# Patient Record
Sex: MALE | Race: WHITE | NOT HISPANIC OR LATINO | URBAN - METROPOLITAN AREA
[De-identification: names, ages, dates, MRNs, and addresses within clinical notes are randomized per-mention and may not be internally consistent; named-entity substitution may affect disease eponyms.]

---

## 2023-12-31 ENCOUNTER — INPATIENT (INPATIENT)
Facility: HOSPITAL | Age: 73
LOS: 2 days | Discharge: HOME | DRG: 193 | End: 2024-01-03
Attending: INTERNAL MEDICINE | Admitting: STUDENT IN AN ORGANIZED HEALTH CARE EDUCATION/TRAINING PROGRAM
Payer: COMMERCIAL

## 2023-12-31 VITALS
DIASTOLIC BLOOD PRESSURE: 71 MMHG | TEMPERATURE: 97 F | SYSTOLIC BLOOD PRESSURE: 117 MMHG | RESPIRATION RATE: 20 BRPM | HEART RATE: 91 BPM | OXYGEN SATURATION: 97 % | WEIGHT: 164.91 LBS | HEIGHT: 72 IN

## 2023-12-31 DIAGNOSIS — I48.20 CHRONIC ATRIAL FIBRILLATION, UNSPECIFIED: ICD-10-CM

## 2023-12-31 DIAGNOSIS — Z29.9 ENCOUNTER FOR PROPHYLACTIC MEASURES, UNSPECIFIED: ICD-10-CM

## 2023-12-31 DIAGNOSIS — Z86.19 PERSONAL HISTORY OF OTHER INFECTIOUS AND PARASITIC DISEASES: ICD-10-CM

## 2023-12-31 DIAGNOSIS — R06.02 SHORTNESS OF BREATH: ICD-10-CM

## 2023-12-31 DIAGNOSIS — E87.1 HYPO-OSMOLALITY AND HYPONATREMIA: ICD-10-CM

## 2023-12-31 DIAGNOSIS — J18.9 PNEUMONIA, UNSPECIFIED ORGANISM: ICD-10-CM

## 2023-12-31 DIAGNOSIS — J96.01 ACUTE RESPIRATORY FAILURE WITH HYPOXIA: ICD-10-CM

## 2023-12-31 LAB
ALBUMIN SERPL ELPH-MCNC: 2.4 G/DL — LOW (ref 3.3–5)
ALBUMIN SERPL ELPH-MCNC: 2.4 G/DL — LOW (ref 3.3–5)
ALBUMIN SERPL ELPH-MCNC: 3.1 G/DL — LOW (ref 3.3–5)
ALBUMIN SERPL ELPH-MCNC: 3.1 G/DL — LOW (ref 3.3–5)
ALP SERPL-CCNC: 65 U/L — SIGNIFICANT CHANGE UP (ref 40–120)
ALP SERPL-CCNC: 65 U/L — SIGNIFICANT CHANGE UP (ref 40–120)
ALP SERPL-CCNC: 72 U/L — SIGNIFICANT CHANGE UP (ref 40–120)
ALP SERPL-CCNC: 72 U/L — SIGNIFICANT CHANGE UP (ref 40–120)
ALT FLD-CCNC: 19 U/L — SIGNIFICANT CHANGE UP (ref 10–45)
ALT FLD-CCNC: 19 U/L — SIGNIFICANT CHANGE UP (ref 10–45)
ALT FLD-CCNC: SIGNIFICANT CHANGE UP U/L (ref 10–45)
ALT FLD-CCNC: SIGNIFICANT CHANGE UP U/L (ref 10–45)
ANION GAP SERPL CALC-SCNC: 11 MMOL/L — SIGNIFICANT CHANGE UP (ref 5–17)
ANION GAP SERPL CALC-SCNC: 11 MMOL/L — SIGNIFICANT CHANGE UP (ref 5–17)
ANION GAP SERPL CALC-SCNC: 12 MMOL/L — SIGNIFICANT CHANGE UP (ref 5–17)
ANION GAP SERPL CALC-SCNC: 12 MMOL/L — SIGNIFICANT CHANGE UP (ref 5–17)
AST SERPL-CCNC: 30 U/L — SIGNIFICANT CHANGE UP (ref 10–40)
AST SERPL-CCNC: 30 U/L — SIGNIFICANT CHANGE UP (ref 10–40)
AST SERPL-CCNC: SIGNIFICANT CHANGE UP U/L (ref 10–40)
AST SERPL-CCNC: SIGNIFICANT CHANGE UP U/L (ref 10–40)
BASE EXCESS BLDA CALC-SCNC: 2.8 MMOL/L — SIGNIFICANT CHANGE UP (ref -2–3)
BASE EXCESS BLDA CALC-SCNC: 2.8 MMOL/L — SIGNIFICANT CHANGE UP (ref -2–3)
BILIRUB SERPL-MCNC: 0.4 MG/DL — SIGNIFICANT CHANGE UP (ref 0.2–1.2)
BILIRUB SERPL-MCNC: 0.4 MG/DL — SIGNIFICANT CHANGE UP (ref 0.2–1.2)
BILIRUB SERPL-MCNC: 0.5 MG/DL — SIGNIFICANT CHANGE UP (ref 0.2–1.2)
BILIRUB SERPL-MCNC: 0.5 MG/DL — SIGNIFICANT CHANGE UP (ref 0.2–1.2)
BUN SERPL-MCNC: 14 MG/DL — SIGNIFICANT CHANGE UP (ref 7–23)
BUN SERPL-MCNC: 14 MG/DL — SIGNIFICANT CHANGE UP (ref 7–23)
BUN SERPL-MCNC: 17 MG/DL — SIGNIFICANT CHANGE UP (ref 7–23)
BUN SERPL-MCNC: 17 MG/DL — SIGNIFICANT CHANGE UP (ref 7–23)
CALCIUM SERPL-MCNC: 7.8 MG/DL — LOW (ref 8.4–10.5)
CALCIUM SERPL-MCNC: 7.8 MG/DL — LOW (ref 8.4–10.5)
CALCIUM SERPL-MCNC: 8.6 MG/DL — SIGNIFICANT CHANGE UP (ref 8.4–10.5)
CALCIUM SERPL-MCNC: 8.6 MG/DL — SIGNIFICANT CHANGE UP (ref 8.4–10.5)
CHLORIDE SERPL-SCNC: 94 MMOL/L — LOW (ref 96–108)
CHLORIDE SERPL-SCNC: 94 MMOL/L — LOW (ref 96–108)
CHLORIDE SERPL-SCNC: 95 MMOL/L — LOW (ref 96–108)
CHLORIDE SERPL-SCNC: 95 MMOL/L — LOW (ref 96–108)
CO2 BLDA-SCNC: 28 MMOL/L — HIGH (ref 19–24)
CO2 BLDA-SCNC: 28 MMOL/L — HIGH (ref 19–24)
CO2 SERPL-SCNC: 24 MMOL/L — SIGNIFICANT CHANGE UP (ref 22–31)
CO2 SERPL-SCNC: 24 MMOL/L — SIGNIFICANT CHANGE UP (ref 22–31)
CO2 SERPL-SCNC: 25 MMOL/L — SIGNIFICANT CHANGE UP (ref 22–31)
CO2 SERPL-SCNC: 25 MMOL/L — SIGNIFICANT CHANGE UP (ref 22–31)
CREAT ?TM UR-MCNC: 187 MG/DL — SIGNIFICANT CHANGE UP
CREAT ?TM UR-MCNC: 187 MG/DL — SIGNIFICANT CHANGE UP
CREAT SERPL-MCNC: 0.7 MG/DL — SIGNIFICANT CHANGE UP (ref 0.5–1.3)
EGFR: 97 ML/MIN/1.73M2 — SIGNIFICANT CHANGE UP
GLUCOSE SERPL-MCNC: 137 MG/DL — HIGH (ref 70–99)
GLUCOSE SERPL-MCNC: 137 MG/DL — HIGH (ref 70–99)
GLUCOSE SERPL-MCNC: 232 MG/DL — HIGH (ref 70–99)
GLUCOSE SERPL-MCNC: 232 MG/DL — HIGH (ref 70–99)
GRAM STN FLD: ABNORMAL
GRAM STN FLD: ABNORMAL
HCO3 BLDA-SCNC: 27 MMOL/L — SIGNIFICANT CHANGE UP (ref 21–28)
HCO3 BLDA-SCNC: 27 MMOL/L — SIGNIFICANT CHANGE UP (ref 21–28)
HCT VFR BLD CALC: 38.9 % — LOW (ref 39–50)
HCT VFR BLD CALC: 38.9 % — LOW (ref 39–50)
HGB BLD-MCNC: 13.3 G/DL — SIGNIFICANT CHANGE UP (ref 13–17)
HGB BLD-MCNC: 13.3 G/DL — SIGNIFICANT CHANGE UP (ref 13–17)
LEGIONELLA AG UR QL: NEGATIVE — SIGNIFICANT CHANGE UP
LEGIONELLA AG UR QL: NEGATIVE — SIGNIFICANT CHANGE UP
MCHC RBC-ENTMCNC: 32.1 PG — SIGNIFICANT CHANGE UP (ref 27–34)
MCHC RBC-ENTMCNC: 32.1 PG — SIGNIFICANT CHANGE UP (ref 27–34)
MCHC RBC-ENTMCNC: 34.2 GM/DL — SIGNIFICANT CHANGE UP (ref 32–36)
MCHC RBC-ENTMCNC: 34.2 GM/DL — SIGNIFICANT CHANGE UP (ref 32–36)
MCV RBC AUTO: 94 FL — SIGNIFICANT CHANGE UP (ref 80–100)
MCV RBC AUTO: 94 FL — SIGNIFICANT CHANGE UP (ref 80–100)
NRBC # BLD: 0 /100 WBCS — SIGNIFICANT CHANGE UP (ref 0–0)
NRBC # BLD: 0 /100 WBCS — SIGNIFICANT CHANGE UP (ref 0–0)
OSMOLALITY UR: 657 MOSM/KG — SIGNIFICANT CHANGE UP (ref 300–900)
OSMOLALITY UR: 657 MOSM/KG — SIGNIFICANT CHANGE UP (ref 300–900)
PCO2 BLDA: 40 MMHG — SIGNIFICANT CHANGE UP (ref 35–48)
PCO2 BLDA: 40 MMHG — SIGNIFICANT CHANGE UP (ref 35–48)
PH BLDA: 7.44 — SIGNIFICANT CHANGE UP (ref 7.35–7.45)
PH BLDA: 7.44 — SIGNIFICANT CHANGE UP (ref 7.35–7.45)
PLATELET # BLD AUTO: 380 K/UL — SIGNIFICANT CHANGE UP (ref 150–400)
PLATELET # BLD AUTO: 380 K/UL — SIGNIFICANT CHANGE UP (ref 150–400)
PO2 BLDA: 98 MMHG — SIGNIFICANT CHANGE UP (ref 83–108)
PO2 BLDA: 98 MMHG — SIGNIFICANT CHANGE UP (ref 83–108)
POTASSIUM SERPL-MCNC: 4.1 MMOL/L — SIGNIFICANT CHANGE UP (ref 3.5–5.3)
POTASSIUM SERPL-MCNC: 4.1 MMOL/L — SIGNIFICANT CHANGE UP (ref 3.5–5.3)
POTASSIUM SERPL-MCNC: SIGNIFICANT CHANGE UP MMOL/L (ref 3.5–5.3)
POTASSIUM SERPL-MCNC: SIGNIFICANT CHANGE UP MMOL/L (ref 3.5–5.3)
POTASSIUM SERPL-SCNC: 4.1 MMOL/L — SIGNIFICANT CHANGE UP (ref 3.5–5.3)
POTASSIUM SERPL-SCNC: 4.1 MMOL/L — SIGNIFICANT CHANGE UP (ref 3.5–5.3)
POTASSIUM SERPL-SCNC: SIGNIFICANT CHANGE UP MMOL/L (ref 3.5–5.3)
POTASSIUM SERPL-SCNC: SIGNIFICANT CHANGE UP MMOL/L (ref 3.5–5.3)
POTASSIUM UR-SCNC: 32 MMOL/L — SIGNIFICANT CHANGE UP
POTASSIUM UR-SCNC: 32 MMOL/L — SIGNIFICANT CHANGE UP
PROT ?TM UR-MCNC: 48 MG/DL — HIGH (ref 0–12)
PROT ?TM UR-MCNC: 48 MG/DL — HIGH (ref 0–12)
PROT SERPL-MCNC: 6.2 G/DL — SIGNIFICANT CHANGE UP (ref 6–8.3)
PROT SERPL-MCNC: 6.2 G/DL — SIGNIFICANT CHANGE UP (ref 6–8.3)
PROT SERPL-MCNC: 7.1 G/DL — SIGNIFICANT CHANGE UP (ref 6–8.3)
PROT SERPL-MCNC: 7.1 G/DL — SIGNIFICANT CHANGE UP (ref 6–8.3)
PROT/CREAT UR-RTO: 0.3 RATIO — HIGH (ref 0–0.2)
PROT/CREAT UR-RTO: 0.3 RATIO — HIGH (ref 0–0.2)
RAPID RVP RESULT: SIGNIFICANT CHANGE UP
RAPID RVP RESULT: SIGNIFICANT CHANGE UP
RBC # BLD: 4.14 M/UL — LOW (ref 4.2–5.8)
RBC # BLD: 4.14 M/UL — LOW (ref 4.2–5.8)
RBC # FLD: 13.2 % — SIGNIFICANT CHANGE UP (ref 10.3–14.5)
RBC # FLD: 13.2 % — SIGNIFICANT CHANGE UP (ref 10.3–14.5)
S PNEUM AG UR QL: NEGATIVE — SIGNIFICANT CHANGE UP
S PNEUM AG UR QL: NEGATIVE — SIGNIFICANT CHANGE UP
SAO2 % BLDA: 99.2 % — HIGH (ref 94–98)
SAO2 % BLDA: 99.2 % — HIGH (ref 94–98)
SARS-COV-2 RNA SPEC QL NAA+PROBE: SIGNIFICANT CHANGE UP
SARS-COV-2 RNA SPEC QL NAA+PROBE: SIGNIFICANT CHANGE UP
SODIUM SERPL-SCNC: 130 MMOL/L — LOW (ref 135–145)
SODIUM SERPL-SCNC: 130 MMOL/L — LOW (ref 135–145)
SODIUM SERPL-SCNC: 131 MMOL/L — LOW (ref 135–145)
SODIUM SERPL-SCNC: 131 MMOL/L — LOW (ref 135–145)
SODIUM UR-SCNC: 20 MMOL/L — SIGNIFICANT CHANGE UP
SODIUM UR-SCNC: 20 MMOL/L — SIGNIFICANT CHANGE UP
SPECIMEN SOURCE: SIGNIFICANT CHANGE UP
SPECIMEN SOURCE: SIGNIFICANT CHANGE UP
WBC # BLD: 9.81 K/UL — SIGNIFICANT CHANGE UP (ref 3.8–10.5)
WBC # BLD: 9.81 K/UL — SIGNIFICANT CHANGE UP (ref 3.8–10.5)
WBC # FLD AUTO: 9.81 K/UL — SIGNIFICANT CHANGE UP (ref 3.8–10.5)
WBC # FLD AUTO: 9.81 K/UL — SIGNIFICANT CHANGE UP (ref 3.8–10.5)

## 2023-12-31 PROCEDURE — 71045 X-RAY EXAM CHEST 1 VIEW: CPT | Mod: 26

## 2023-12-31 PROCEDURE — 99285 EMERGENCY DEPT VISIT HI MDM: CPT

## 2023-12-31 PROCEDURE — 99223 1ST HOSP IP/OBS HIGH 75: CPT

## 2023-12-31 RX ORDER — IPRATROPIUM/ALBUTEROL SULFATE 18-103MCG
3 AEROSOL WITH ADAPTER (GRAM) INHALATION EVERY 6 HOURS
Refills: 0 | Status: DISCONTINUED | OUTPATIENT
Start: 2023-12-31 | End: 2024-01-03

## 2023-12-31 RX ORDER — ONDANSETRON 8 MG/1
4 TABLET, FILM COATED ORAL EVERY 8 HOURS
Refills: 0 | Status: DISCONTINUED | OUTPATIENT
Start: 2023-12-31 | End: 2024-01-03

## 2023-12-31 RX ORDER — AZITHROMYCIN 500 MG/1
500 TABLET, FILM COATED ORAL EVERY 24 HOURS
Refills: 0 | Status: DISCONTINUED | OUTPATIENT
Start: 2024-01-01 | End: 2024-01-02

## 2023-12-31 RX ORDER — CEFTRIAXONE 500 MG/1
1000 INJECTION, POWDER, FOR SOLUTION INTRAMUSCULAR; INTRAVENOUS ONCE
Refills: 0 | Status: COMPLETED | OUTPATIENT
Start: 2023-12-31 | End: 2023-12-31

## 2023-12-31 RX ORDER — SODIUM CHLORIDE 9 MG/ML
1000 INJECTION INTRAMUSCULAR; INTRAVENOUS; SUBCUTANEOUS ONCE
Refills: 0 | Status: COMPLETED | OUTPATIENT
Start: 2023-12-31 | End: 2023-12-31

## 2023-12-31 RX ORDER — ACETAMINOPHEN 500 MG
650 TABLET ORAL EVERY 6 HOURS
Refills: 0 | Status: DISCONTINUED | OUTPATIENT
Start: 2023-12-31 | End: 2024-01-03

## 2023-12-31 RX ORDER — IPRATROPIUM/ALBUTEROL SULFATE 18-103MCG
3 AEROSOL WITH ADAPTER (GRAM) INHALATION ONCE
Refills: 0 | Status: COMPLETED | OUTPATIENT
Start: 2023-12-31 | End: 2023-12-31

## 2023-12-31 RX ORDER — APIXABAN 2.5 MG/1
5 TABLET, FILM COATED ORAL EVERY 12 HOURS
Refills: 0 | Status: DISCONTINUED | OUTPATIENT
Start: 2023-12-31 | End: 2024-01-03

## 2023-12-31 RX ORDER — AZITHROMYCIN 500 MG/1
500 TABLET, FILM COATED ORAL ONCE
Refills: 0 | Status: COMPLETED | OUTPATIENT
Start: 2023-12-31 | End: 2023-12-31

## 2023-12-31 RX ORDER — CEFTRIAXONE 500 MG/1
1000 INJECTION, POWDER, FOR SOLUTION INTRAMUSCULAR; INTRAVENOUS EVERY 24 HOURS
Refills: 0 | Status: DISCONTINUED | OUTPATIENT
Start: 2024-01-01 | End: 2024-01-02

## 2023-12-31 RX ORDER — LANOLIN ALCOHOL/MO/W.PET/CERES
3 CREAM (GRAM) TOPICAL AT BEDTIME
Refills: 0 | Status: DISCONTINUED | OUTPATIENT
Start: 2023-12-31 | End: 2024-01-03

## 2023-12-31 RX ADMIN — Medication 100 MILLIGRAM(S): at 21:37

## 2023-12-31 RX ADMIN — SODIUM CHLORIDE 1000 MILLILITER(S): 9 INJECTION INTRAMUSCULAR; INTRAVENOUS; SUBCUTANEOUS at 10:09

## 2023-12-31 RX ADMIN — Medication 3 MILLILITER(S): at 09:30

## 2023-12-31 RX ADMIN — Medication 3 MILLIGRAM(S): at 21:44

## 2023-12-31 RX ADMIN — AZITHROMYCIN 255 MILLIGRAM(S): 500 TABLET, FILM COATED ORAL at 12:05

## 2023-12-31 RX ADMIN — CEFTRIAXONE 100 MILLIGRAM(S): 500 INJECTION, POWDER, FOR SOLUTION INTRAMUSCULAR; INTRAVENOUS at 12:05

## 2023-12-31 RX ADMIN — APIXABAN 5 MILLIGRAM(S): 2.5 TABLET, FILM COATED ORAL at 17:42

## 2023-12-31 NOTE — H&P ADULT - HISTORY OF PRESENT ILLNESS
Patient is a 73y Male with PMH of Afib (on eliquis) and MAC (disgnosed ~5 years and was on chronic abx until around 1.5 yrs ago) presenting to the ED with complaints of sob, cough and fever. Patient states his symptoms started 3 days when he had a temp of 102.4 and was coughing a lot. especially at night.  Pt is currently still feeling short of breathe and feelings of subjective fever. Reports he had dinner with a friend 1 week ago and the friend tested positive for Covid the next day, although he tested negative himself. Pt was also hypoxic to 90% on RA upon arrival and put on 3L NC sating 97%.  Denies runny nba, nasal congestion, abd pain, chest pain, palpitations, n/v, unexplained weight loss.     In the ED:  Initial vital signs: 97.4 T:  F, HR: 91, BP: 117/71, R: 20, SpO2: 97% on 3L NC  ED course:   Labs: significant for wbc wnl, Na 130   Imaging:  CXR: Bilateral infiltrates  EKG: Afib, vent rate 84  Medications: Duonebx x1, NS 1L bolus, CTX x1, Azithromycin 500 mg x1       Patient is a 73y Male with PMH of Afib (on eliquis) and MAC diagnosed ~5 years and was on chronic abx until around 1.5 yrs ago) presenting to the ED with complaints of sob, cough and fever. Patient states his symptoms started 3 days when he had a temp of 102.4 and was coughing a lot. especially at night.  Pt is currently still feeling short of breathe and feelings of subjective fever. Reports he had dinner with a friend 1 week ago and the friend tested positive for Covid the next day, although he tested negative himself. Pt was also hypoxic to 90% on RA upon arrival and put on 3L NC sating 97%.  Denies runny nba, nasal congestion, abd pain, chest pain, palpitations, n/v, unexplained weight loss.     In the ED:  Initial vital signs: 97.4 T:  F, HR: 91, BP: 117/71, R: 20, SpO2: 97% on 3L NC  ED course:   Labs: significant for wbc wnl, Na 130   Imaging:  CXR: Bilateral infiltrates  EKG: Afib, vent rate 84  Medications: Duonebx x1, NS 1L bolus, CTX x1, Azithromycin 500 mg x1

## 2023-12-31 NOTE — H&P ADULT - ASSESSMENT
Patient is a 73y Male with PMH of Afib (on eliquis) and MAC (disgnosed ~5 years and was on chronic abx until around 1.5 yrs ago) presenting to the ED with complaints of sob, cough and fever, found to be hypoxic on RA with bilateral infiltrates on CXR. Admitted for further management.

## 2023-12-31 NOTE — H&P ADULT - PROBLEM SELECTOR PLAN 3
Patient with Na of 130 on chemistry  -Likely in the setting of poor po intake     Plan:   -F/U Repeat BMP   -F/U urine lytes

## 2023-12-31 NOTE — H&P ADULT - NSHPLABSRESULTS_GEN_ALL_CORE
13.3   9.81  )-----------( 380      ( 31 Dec 2023 09:23 )             38.9       12-31    130<L>  |  94<L>  |  17  ----------------------------<  137<H>  SEE NOTE   |  25  |  0.70    Ca    8.6      31 Dec 2023 09:23    TPro  7.1  /  Alb  3.1<L>  /  TBili  0.5  /  DBili  x   /  AST  SEE NOTE  /  ALT  SEE NOTE  /  AlkPhos  72  12-31              Urinalysis Basic - ( 31 Dec 2023 09:23 )    Color: x / Appearance: x / SG: x / pH: x  Gluc: 137 mg/dL / Ketone: x  / Bili: x / Urobili: x   Blood: x / Protein: x / Nitrite: x   Leuk Esterase: x / RBC: x / WBC x   Sq Epi: x / Non Sq Epi: x / Bacteria: x            Lactate Trend            CAPILLARY BLOOD GLUCOSE

## 2023-12-31 NOTE — PHARMACY COMMUNICATION NOTE - COMMENTS
Admission medication  - apixaban 5mg BID (patient said 5mg BID gave him nose bleed during pandemic. He said he discussed this with his primary doctor and decided to take 2.5mg TID.)  Communicated with MD about patient took apixaban 2.5mg TID. Decided to continue patient on 5mg BID and monitor closely on bleeding sign.

## 2023-12-31 NOTE — PATIENT PROFILE ADULT - FALL HARM RISK - UNIVERSAL INTERVENTIONS
Bed in lowest position, wheels locked, appropriate side rails in place/Call bell, personal items and telephone in reach/Instruct patient to call for assistance before getting out of bed or chair/Non-slip footwear when patient is out of bed/[place] to call system/Physically safe environment - no spills, clutter or unnecessary equipment/Purposeful Proactive Rounding/Room/bathroom lighting operational, light cord in reach Bed in lowest position, wheels locked, appropriate side rails in place/Call bell, personal items and telephone in reach/Instruct patient to call for assistance before getting out of bed or chair/Non-slip footwear when patient is out of bed/[place] to call system/Physically safe environment - no spills, clutter or unnecessary equipment/Purposeful Proactive Rounding/Room/bathroom lighting operational, light cord in reach

## 2023-12-31 NOTE — ED PROVIDER NOTE - OBJECTIVE STATEMENT
73m hx afib, MAC (was on chronic abx until ~1.5 years ago, follows at [facility]) several days of fever, cough, sob. tested negative for covid/flu. dec. po intake. no chst pain. no leg swelling/pain.

## 2023-12-31 NOTE — H&P ADULT - NSHPPHYSICALEXAM_GEN_ALL_CORE
.  VITAL SIGNS:  T(C): 36.3 (12-31-23 @ 09:10), Max: 36.3 (12-31-23 @ 09:10)  T(F): 97.4 (12-31-23 @ 09:10), Max: 97.4 (12-31-23 @ 09:10)  HR: 91 (12-31-23 @ 09:10) (91 - 91)  BP: 117/71 (12-31-23 @ 09:10) (117/71 - 117/71)  BP(mean): --  RR: 20 (12-31-23 @ 09:10) (20 - 20)  SpO2: 97% (12-31-23 @ 09:10) (97% - 97%)  Wt(kg): --    PHYSICAL EXAM:    Constitutional: Pt in mild distress-sob  Head: NC/AT  Eyes: PERRL, EOMI, anicteric sclera  ENT: no nasal discharge, slightly dry mucous membranes   Respiratory: CTA B/L; +diffuse wheezing   Cardiac: +S1/S2; +irregular rhythm,  Gastrointestinal: abdomen soft, NT/ND; no rebound or guarding; +BSx4  Extremities: WWP, no clubbing or cyanosis; no peripheral edema  Vascular: 2+ radial, femoral, DP/PT pulses B/L  Neurologic: AAOx3; no focal deficits  Psychiatric: affect and characteristics of appearance, verbalizations, behaviors are appropriate

## 2023-12-31 NOTE — ED ADULT TRIAGE NOTE - CHIEF COMPLAINT QUOTE
Pt presents to ED c/o shortness of breath, fever x 5 days. denies chest pain. tested negative for covid and flu at urgent care. Per EMS, pt was 90% on room air. Placed on 3L NC PTA. 12 lead EKG done.

## 2023-12-31 NOTE — ED PROVIDER NOTE - CLINICAL SUMMARY MEDICAL DECISION MAKING FREE TEXT BOX
likely infection, will eval for inciting cause. symptoms not consistent with PE considering lack of tachycardia, chest pain and presence of symptoms that are more consistent with infection. no sign of fluid overload. likely infection, will eval for inciting cause. symptoms not consistent with PE considering lack of tachycardia, chest pain and presence of symptoms that are more consistent with infection. no sign of fluid overload.    will admit for hypoxia in setting of likely infection

## 2023-12-31 NOTE — ED PROVIDER NOTE - NSCAREINITIATED _GEN_ER
[Mother] : mother [Breast milk] : breast milk [Formula ___ oz/feed] : [unfilled] oz of formula per feed [Hours between feeds ___] : Child is fed every [unfilled] hours [Normal] : Normal [___ stools every other day] : [unfilled]  stools every other day [___ voids per day] : [unfilled] voids per day [In Crib] : sleeps in crib [On back] : sleeps on back [Pacifier use] : Pacifier use [Rear facing car seat in back seat] : Rear facing car seat in back seat [No] : No cigarette smoke exposure [Smoke Detectors] : Smoke detectors at home. [Vitamins ___] : no vitamins Godwin Perez(Attending) [Co-sleeping] : no co-sleeping

## 2023-12-31 NOTE — ED ADULT NURSE REASSESSMENT NOTE - NS ED NURSE REASSESS COMMENT FT1
calling to give report to [address] [place] [address] [id number]. safety maintained, all needs met. calling to give report to [address] [place] [address] [phone]. safety maintained, all needs met.

## 2023-12-31 NOTE — H&P ADULT - ATTENDING COMMENTS
73M with history of atrial fibrillation, MAC (s/p therapy with azithromycin, rifampin and ethambutol, prolonged for over a year) who presented with dyspnea, cough, subjective fevers and generalized weakness. Reports sick contacts prior to symptoms onset. His cough is productive of whitish sputum and his dyspnea occurs at rest. Denies active smoking (has a remote smoking history). Although he has been afebrile while inpatient, he reports a subjective fever of ~102F, Physical exam notable for diffuse wheezing. Labs without leukocytosis but with hyponatremia (131). Reportedly hypoxic on room air to SpO2 90% with improvement to 97% on 2-3L NC. ABG with PaO2 98% on 2-3L NC. Denies leg swelling/pain, no history of prolonged travel/immobalization or recent surgery. His hypoxia is less likely related to pulmonary embolism given that his Well's score is 0. CXR shows bilateral infiltrates more pronounced medially and at the lung bases. There is no prior imaging for comparison. No prior hospitalizations and lives at home. His presentation of subjective fevers, dyspnea, cough and generalized weakness could be likely related to a viral URI, but cannot exclude the possibility of pneumonia (e.g. legionella), especially with the presence of slight hyponatremia and history of MAC.    #Acute hypoxic respiratory failure likely secondary to viral URI vs. community-acquired pneumonia  - Supplemental oxygen as needed to maintain SpO2 >92% and PaO2>60, wean as tolerated  - Send RCx, RVP, urine strep/legionella Ag  - Broad spectrum abx with ceftriaxone and azithromycin  - Duonebs q6hrs PRN  - Incentive spirometry  - Tylenol PRN for fever/pain  - Obtain medical records from patient's pulmonologist    #Hyponatremia  - Na 131, send urine electrolytes, urine osmolarity and serum osmolarity  - Trend Na, monitor other electrolytes    #Atrial fibrillation  - Rate controlled, continue with Eliquis 5mg BID

## 2023-12-31 NOTE — H&P ADULT - PROBLEM SELECTOR PLAN 5
Patient with h/o MAC, was diagnosed ~ 5 years and treated with Rifampin, Ethambutol  and Azithromycin for ~1.5 yrs ago.   Patient follow with Dr. Arlyn Aguiar at [facility]. Last seen ~3 months ago per patient.     -Obtain further collateral from pt's Pulmonologist   -Follow up with outpatient. Patient with h/o MAC, was diagnosed ~ 5 years and treated with Rifampin, Ethambutol  and Azithromycin for ~1.5 yrs ago.   Patient follow with Dr. Arlyn Aguiar at [facility]. Last seen ~3 months ago per patient.     -Obtain further collateral from pt's Pulmonologist   -Follow up with outpatient.

## 2023-12-31 NOTE — H&P ADULT - PROBLEM SELECTOR PLAN 1
Pt p/w sob, cough and fever x~ 3days. Recently exposed to covid 1 week ago.   Pt was hypoxic to 90% on RA and put on 3L NC, sating 97% currently.  CXR showed some biltaeral infiltrates. WBC wnl. Currently does not meet SIRS criteria and Curb 65 score of 1    Plan:   -F/u RVP  -F/u Bx Cx and Sputum Cx  -C/w CTX and Azithromycin  -F/u urine strep/legionella  -C/w Duonebs prn and Tessalon perle   -Wean O2 as tolerated Pt p/w sob, cough and fever x~ 3days. Recently exposed to covid 1 week ago.   Pt was hypoxic to 90% on RA and put on 3L NC, sating 97% currently.  CXR showed some biltaeral infiltrates. WBC wnl. Currently does not meet SIRS criteria and Curb 65 score of 1    Plan:   -F/U ABG  -F/u RVP  -F/u Bx Cx and Sputum Cx  -C/w CTX and Azithromycin  -F/u urine strep/legionella  -C/w Duonebs prn and Tessalon perle   -Wean O2 as tolerated

## 2023-12-31 NOTE — PATIENT PROFILE ADULT - FUNCTIONAL ASSESSMENT - DAILY ACTIVITY 6.
RX pended for MD approval     Last OV: 3/6/21   Last CPX: 12/3/20   Last Labs: 3/6/21   CPX scheduled for: 4/12/22     Medication last filled: 2/23/22 #60 with 0 refills     
Left arm;
4 = No assist / stand by assistance

## 2023-12-31 NOTE — ED ADULT NURSE NOTE - OBJECTIVE STATEMENT
73 y.o. Male A/Ox4 BIBA from urgent care c/o SOB x4 days with fever, cough/congestion. at home tmax 102.4F. Denies CP, abd pain  Pt presents to ED c/o shortness of breath, fever x 5 days. denies chest pain. urgent care tested negative for covid/flu. Per EMS 90% On RA placed on 3L O2 via NC.

## 2023-12-31 NOTE — ED PROVIDER NOTE - PHYSICAL EXAMINATION
General: Awake, alert and oriented. No acute distress.   Skin:  Appropriate color for ethnicity  HENMT: head normocephalic and atraumatic  EYES: Conjunctiva clear. nonicteric sclera  Cardiac: well perfused  Respiratory: coughing; no accessory muscle usage on NC, diffsue mild expiratory wheezing.   Abdominal: nondistended  MSK:  no visualized external signs of trauma. no apparent deficits in ROM of any extremity, no leg swelling/ttp.   Neurological: The patient is awake, alert and oriented with normal speech. CN 2-12 grossly intact. no apparent deficits. Memory is normal and thought process is intact. moving all extremities spontaneously   Psychiatric: Appropriate mood and affect. Good judgement and insight

## 2023-12-31 NOTE — ED ADULT NURSE NOTE - NSFALLRISKINTERV_ED_ALL_ED
Communicate fall risk and risk factors to all staff, patient, and family/Provide visual cue: yellow wristband, yellow gown, etc/Reinforce activity limits and safety measures with patient and family/Call bell, personal items and telephone in reach/Instruct patient to call for assistance before getting out of bed/chair/stretcher/Non-slip footwear applied when patient is off stretcher/[place] to call system/Physically safe environment - no spills, clutter or unnecessary equipment/Purposeful Proactive Rounding/Room/bathroom lighting operational, light cord in reach Communicate fall risk and risk factors to all staff, patient, and family/Provide visual cue: yellow wristband, yellow gown, etc/Reinforce activity limits and safety measures with patient and family/Call bell, personal items and telephone in reach/Instruct patient to call for assistance before getting out of bed/chair/stretcher/Non-slip footwear applied when patient is off stretcher/[place] to call system/Physically safe environment - no spills, clutter or unnecessary equipment/Purposeful Proactive Rounding/Room/bathroom lighting operational, light cord in reach

## 2024-01-01 LAB
ALBUMIN SERPL ELPH-MCNC: 2.8 G/DL — LOW (ref 3.3–5)
ALBUMIN SERPL ELPH-MCNC: 2.8 G/DL — LOW (ref 3.3–5)
ALP SERPL-CCNC: 121 U/L — HIGH (ref 40–120)
ALP SERPL-CCNC: 121 U/L — HIGH (ref 40–120)
ALT FLD-CCNC: 22 U/L — SIGNIFICANT CHANGE UP (ref 10–45)
ALT FLD-CCNC: 22 U/L — SIGNIFICANT CHANGE UP (ref 10–45)
ANION GAP SERPL CALC-SCNC: 11 MMOL/L — SIGNIFICANT CHANGE UP (ref 5–17)
ANION GAP SERPL CALC-SCNC: 11 MMOL/L — SIGNIFICANT CHANGE UP (ref 5–17)
AST SERPL-CCNC: 26 U/L — SIGNIFICANT CHANGE UP (ref 10–40)
AST SERPL-CCNC: 26 U/L — SIGNIFICANT CHANGE UP (ref 10–40)
BASOPHILS # BLD AUTO: 0.04 K/UL — SIGNIFICANT CHANGE UP (ref 0–0.2)
BASOPHILS # BLD AUTO: 0.04 K/UL — SIGNIFICANT CHANGE UP (ref 0–0.2)
BASOPHILS NFR BLD AUTO: 0.4 % — SIGNIFICANT CHANGE UP (ref 0–2)
BASOPHILS NFR BLD AUTO: 0.4 % — SIGNIFICANT CHANGE UP (ref 0–2)
BILIRUB SERPL-MCNC: 0.3 MG/DL — SIGNIFICANT CHANGE UP (ref 0.2–1.2)
BILIRUB SERPL-MCNC: 0.3 MG/DL — SIGNIFICANT CHANGE UP (ref 0.2–1.2)
BUN SERPL-MCNC: 12 MG/DL — SIGNIFICANT CHANGE UP (ref 7–23)
BUN SERPL-MCNC: 12 MG/DL — SIGNIFICANT CHANGE UP (ref 7–23)
CALCIUM SERPL-MCNC: 9 MG/DL — SIGNIFICANT CHANGE UP (ref 8.4–10.5)
CALCIUM SERPL-MCNC: 9 MG/DL — SIGNIFICANT CHANGE UP (ref 8.4–10.5)
CHLORIDE SERPL-SCNC: 97 MMOL/L — SIGNIFICANT CHANGE UP (ref 96–108)
CHLORIDE SERPL-SCNC: 97 MMOL/L — SIGNIFICANT CHANGE UP (ref 96–108)
CO2 SERPL-SCNC: 27 MMOL/L — SIGNIFICANT CHANGE UP (ref 22–31)
CO2 SERPL-SCNC: 27 MMOL/L — SIGNIFICANT CHANGE UP (ref 22–31)
CREAT SERPL-MCNC: 0.72 MG/DL — SIGNIFICANT CHANGE UP (ref 0.5–1.3)
CREAT SERPL-MCNC: 0.72 MG/DL — SIGNIFICANT CHANGE UP (ref 0.5–1.3)
EGFR: 96 ML/MIN/1.73M2 — SIGNIFICANT CHANGE UP
EGFR: 96 ML/MIN/1.73M2 — SIGNIFICANT CHANGE UP
EOSINOPHIL # BLD AUTO: 0.03 K/UL — SIGNIFICANT CHANGE UP (ref 0–0.5)
EOSINOPHIL # BLD AUTO: 0.03 K/UL — SIGNIFICANT CHANGE UP (ref 0–0.5)
EOSINOPHIL NFR BLD AUTO: 0.3 % — SIGNIFICANT CHANGE UP (ref 0–6)
EOSINOPHIL NFR BLD AUTO: 0.3 % — SIGNIFICANT CHANGE UP (ref 0–6)
GLUCOSE SERPL-MCNC: 123 MG/DL — HIGH (ref 70–99)
GLUCOSE SERPL-MCNC: 123 MG/DL — HIGH (ref 70–99)
HCT VFR BLD CALC: 40.4 % — SIGNIFICANT CHANGE UP (ref 39–50)
HCT VFR BLD CALC: 40.4 % — SIGNIFICANT CHANGE UP (ref 39–50)
HCV AB S/CO SERPL IA: 0.03 S/CO — SIGNIFICANT CHANGE UP
HCV AB S/CO SERPL IA: 0.03 S/CO — SIGNIFICANT CHANGE UP
HCV AB SERPL-IMP: SIGNIFICANT CHANGE UP
HCV AB SERPL-IMP: SIGNIFICANT CHANGE UP
HGB BLD-MCNC: 13.4 G/DL — SIGNIFICANT CHANGE UP (ref 13–17)
HGB BLD-MCNC: 13.4 G/DL — SIGNIFICANT CHANGE UP (ref 13–17)
IMM GRANULOCYTES NFR BLD AUTO: 0.9 % — SIGNIFICANT CHANGE UP (ref 0–0.9)
IMM GRANULOCYTES NFR BLD AUTO: 0.9 % — SIGNIFICANT CHANGE UP (ref 0–0.9)
LYMPHOCYTES # BLD AUTO: 1.25 K/UL — SIGNIFICANT CHANGE UP (ref 1–3.3)
LYMPHOCYTES # BLD AUTO: 1.25 K/UL — SIGNIFICANT CHANGE UP (ref 1–3.3)
LYMPHOCYTES # BLD AUTO: 13.5 % — SIGNIFICANT CHANGE UP (ref 13–44)
LYMPHOCYTES # BLD AUTO: 13.5 % — SIGNIFICANT CHANGE UP (ref 13–44)
MAGNESIUM SERPL-MCNC: 2.5 MG/DL — SIGNIFICANT CHANGE UP (ref 1.6–2.6)
MAGNESIUM SERPL-MCNC: 2.5 MG/DL — SIGNIFICANT CHANGE UP (ref 1.6–2.6)
MCHC RBC-ENTMCNC: 32 PG — SIGNIFICANT CHANGE UP (ref 27–34)
MCHC RBC-ENTMCNC: 32 PG — SIGNIFICANT CHANGE UP (ref 27–34)
MCHC RBC-ENTMCNC: 33.2 GM/DL — SIGNIFICANT CHANGE UP (ref 32–36)
MCHC RBC-ENTMCNC: 33.2 GM/DL — SIGNIFICANT CHANGE UP (ref 32–36)
MCV RBC AUTO: 96.4 FL — SIGNIFICANT CHANGE UP (ref 80–100)
MCV RBC AUTO: 96.4 FL — SIGNIFICANT CHANGE UP (ref 80–100)
MONOCYTES # BLD AUTO: 0.79 K/UL — SIGNIFICANT CHANGE UP (ref 0–0.9)
MONOCYTES # BLD AUTO: 0.79 K/UL — SIGNIFICANT CHANGE UP (ref 0–0.9)
MONOCYTES NFR BLD AUTO: 8.5 % — SIGNIFICANT CHANGE UP (ref 2–14)
MONOCYTES NFR BLD AUTO: 8.5 % — SIGNIFICANT CHANGE UP (ref 2–14)
NEUTROPHILS # BLD AUTO: 7.09 K/UL — SIGNIFICANT CHANGE UP (ref 1.8–7.4)
NEUTROPHILS # BLD AUTO: 7.09 K/UL — SIGNIFICANT CHANGE UP (ref 1.8–7.4)
NEUTROPHILS NFR BLD AUTO: 76.4 % — SIGNIFICANT CHANGE UP (ref 43–77)
NEUTROPHILS NFR BLD AUTO: 76.4 % — SIGNIFICANT CHANGE UP (ref 43–77)
NRBC # BLD: 0 /100 WBCS — SIGNIFICANT CHANGE UP (ref 0–0)
NRBC # BLD: 0 /100 WBCS — SIGNIFICANT CHANGE UP (ref 0–0)
PHOSPHATE SERPL-MCNC: 2.7 MG/DL — SIGNIFICANT CHANGE UP (ref 2.5–4.5)
PHOSPHATE SERPL-MCNC: 2.7 MG/DL — SIGNIFICANT CHANGE UP (ref 2.5–4.5)
PLATELET # BLD AUTO: 325 K/UL — SIGNIFICANT CHANGE UP (ref 150–400)
PLATELET # BLD AUTO: 325 K/UL — SIGNIFICANT CHANGE UP (ref 150–400)
POTASSIUM SERPL-MCNC: 3.8 MMOL/L — SIGNIFICANT CHANGE UP (ref 3.5–5.3)
POTASSIUM SERPL-MCNC: 3.8 MMOL/L — SIGNIFICANT CHANGE UP (ref 3.5–5.3)
POTASSIUM SERPL-SCNC: 3.8 MMOL/L — SIGNIFICANT CHANGE UP (ref 3.5–5.3)
POTASSIUM SERPL-SCNC: 3.8 MMOL/L — SIGNIFICANT CHANGE UP (ref 3.5–5.3)
PROT SERPL-MCNC: 6.5 G/DL — SIGNIFICANT CHANGE UP (ref 6–8.3)
PROT SERPL-MCNC: 6.5 G/DL — SIGNIFICANT CHANGE UP (ref 6–8.3)
RBC # BLD: 4.19 M/UL — LOW (ref 4.2–5.8)
RBC # BLD: 4.19 M/UL — LOW (ref 4.2–5.8)
RBC # FLD: 13.2 % — SIGNIFICANT CHANGE UP (ref 10.3–14.5)
RBC # FLD: 13.2 % — SIGNIFICANT CHANGE UP (ref 10.3–14.5)
SODIUM SERPL-SCNC: 135 MMOL/L — SIGNIFICANT CHANGE UP (ref 135–145)
SODIUM SERPL-SCNC: 135 MMOL/L — SIGNIFICANT CHANGE UP (ref 135–145)
WBC # BLD: 9.28 K/UL — SIGNIFICANT CHANGE UP (ref 3.8–10.5)
WBC # BLD: 9.28 K/UL — SIGNIFICANT CHANGE UP (ref 3.8–10.5)
WBC # FLD AUTO: 9.28 K/UL — SIGNIFICANT CHANGE UP (ref 3.8–10.5)
WBC # FLD AUTO: 9.28 K/UL — SIGNIFICANT CHANGE UP (ref 3.8–10.5)

## 2024-01-01 PROCEDURE — 99233 SBSQ HOSP IP/OBS HIGH 50: CPT | Mod: GC

## 2024-01-01 RX ADMIN — APIXABAN 5 MILLIGRAM(S): 2.5 TABLET, FILM COATED ORAL at 06:26

## 2024-01-01 RX ADMIN — Medication 100 MILLIGRAM(S): at 22:04

## 2024-01-01 RX ADMIN — APIXABAN 5 MILLIGRAM(S): 2.5 TABLET, FILM COATED ORAL at 19:10

## 2024-01-01 RX ADMIN — AZITHROMYCIN 500 MILLIGRAM(S): 500 TABLET, FILM COATED ORAL at 12:39

## 2024-01-01 RX ADMIN — CEFTRIAXONE 100 MILLIGRAM(S): 500 INJECTION, POWDER, FOR SOLUTION INTRAMUSCULAR; INTRAVENOUS at 12:38

## 2024-01-01 RX ADMIN — Medication 3 MILLIGRAM(S): at 22:04

## 2024-01-01 RX ADMIN — Medication 100 MILLIGRAM(S): at 06:26

## 2024-01-01 NOTE — PROGRESS NOTE ADULT - PROBLEM SELECTOR PLAN 6
F: S/P 1L bolus   E: Replete as needed  N: Regular   D: Eliquis F: S/P 1L bolus   E: Replete as needed  N: Regular   D: Eliquis  GI: NI  Code: FULL CODE

## 2024-01-01 NOTE — OCCUPATIONAL THERAPY INITIAL EVALUATION ADULT - GENERAL OBSERVATIONS, REHAB EVAL
OT IE completed. Pt's SILVERIO Larkin cleared pt for OT. Pt received semisupine in bed, +heplock, room air, NAD, agreeable to OT. Pt's wife present at end of session. Pt tolerated session well, desaturates to 88% on room air with activity, recovers within ~30 seconds seated rest break to 92% SILVERIO Larkin aware. Pt left seated EOB with his wife present, all needs in reach, SILVERIO Larkin aware. OT IE completed. Pt's SILVERIO Larkin cleared pt for OT. Pt received semisupine in bed, +heplock, room air, NAD, agreeable to OT. Pt's wife present at end of session. Pt tolerated session well, desaturates to 88% on room air with activity, recovers within ~30 seconds seated rest break to 92% SILVERIO Larkin aware. Pt left seated EOB with his wife present, all needs in reach, SILVERIO Lrakin aware.

## 2024-01-01 NOTE — OCCUPATIONAL THERAPY INITIAL EVALUATION ADULT - MODIFIED CLINICAL TEST OF SENSORY INTEGRATION IN BALANCE TEST
Pt performed functional mobility in [place] with no device and independence. Pt performed functional mobility in [place] with no device and independence.

## 2024-01-01 NOTE — OCCUPATIONAL THERAPY INITIAL EVALUATION ADULT - LEVEL OF INDEPENDENCE: STAND/SIT, REHAB EVAL
MEDICARE WELLNESS VISIT NOTE      HISTORY OF PRESENT ILLNESS:   Thor Sharma presents for his Subsequent Annual Medicare Wellness Visit.   He has complaints or concerns which include hyperlipidemia prostatism coronary artery disease with abnormal CT calcium score left inguinal hernia. Tetanus vaccine A/2008 Pneumovax 23 5/12 colonoscopy 2008 does have advanced directives. He is not seen optometry in a while. He has hyperlipidemia cholesterol 207 HDL 37  no chest pain no myalgias no shortness breath no weakness and coughing wheezing. No mouth also weakness. Has prostatism last PSA 2.77 and 10/2016. Using saw palmetto. No dysuria no hematuria stream strength is decreased no dribbling no urinary hesitancy has nocturia times one has abnormal CT calcium score previous negative stress test currently on aspirin has left inguinal hernia no pain. Medications were reviewed the patient which has been compliant       Patient Care Team:  Bernard Lopez MD as PCP - General (Family Practice)  Alexi Whiteside MD as Cardiologist (Cardiology)   Patient care team discussed with patient.       Patient Active Problem List    Diagnosis Date Noted   • Prostatism 09/03/2015     Priority: Low   • Left inguinal hernia 09/03/2015     Priority: Low   • Obesity 04/23/2015     Priority: Low   • CAD (coronary artery disease) 08/05/2014     Priority: Low   • Neck pain on right side 08/05/2014     Priority: Low         Past Medical History:   Diagnosis Date   • Asthma    • CAD (coronary artery disease)    • CAD (coronary artery disease) 8/5/2014   • Colon polyps    • High cholesterol    • Hyperlipidemia    • Neck pain on right side 8/5/2014   • Obesity    • Pure hypercholesterolemia 8/5/2014         Past Surgical History:   Procedure Laterality Date   • BACK SURGERY     • COLONOSCOPY W BIOPSY  08/12/2008   • FETAL SURGERY FOR CONGENITAL HERNIA     • REMOVAL OF TONSILS,12+ Y/O           Social History   Substance Use Topics   •  Smoking status: Former Smoker     Types: Cigarettes     Quit date: 8/5/1974   • Smokeless tobacco: Never Used   • Alcohol use 2.5 oz/week     5 drink(s) per week     Drug use:    Drug Use:    No              Family history: Positive stroke hyperlipidemia colon polyps      Current Outpatient Prescriptions   Medication Sig Dispense Refill   • atorvastatin (LIPITOR) 40 MG tablet Take 1 tablet by mouth nightly. 90 tablet 3   • Saw Palmetto, Serenoa repens, (SAW PALMETTO PO) Take by mouth daily.     • TURMERIC CURCUMIN PO Take by mouth daily.     • Ascorbic Acid (VITAMIN C) 500 MG tablet Take 1 tablet by mouth daily.     • aspirin (ASPIRIN) 81 MG EC tablet Take 1 tablet by mouth daily.     • Cholecalciferol (D3-1000) 1000 UNITS capsule Take 1 capsule by mouth daily.     • fish oil-omega-3 fatty acids 1000 MG CAPS Take 1 capsule by mouth 2 times daily.       No current facility-administered medications for this visit.       ALLERGIES:  No Known Allergies     Medicare Health Risk Assessment in electronic health record reviewed. The following items were identified and addressed:    Need for increased physical activity  Vision and hearing screens documented:  Visual acuity not tested  Advanced Directive: Patient has an Advance Directives document, which is on file  Cognitive Assessment: no evidence of cognitive dysfunction by direct observation  Depression Screening performed. Screening time with patient was 1 minutes. PH q.2 to equals 0      Needed Screening/Treatment:   Colorectal cancer screening     Needed follow up: Regular wellness visit 12 months    Social History     Social History   • Marital status:      Spouse name: N/A   • Number of children: N/A   • Years of education: N/A     Social History Main Topics   • Smoking status: Former Smoker     Types: Cigarettes     Quit date: 8/5/1974   • Smokeless tobacco: Never Used   • Alcohol use 2.5 oz/week     5 drink(s) per week   • Drug use: No   • Sexual activity:  Not Asked     Other Topics Concern   • None     Social History Narrative   • None       REVIEW OF SYSTEMS: The patient DENIES symptoms of:   General: Fever, chills, night sweats, fatigue, there is been 3 pound weight loss, denied: weight gain, decreased appetite, facial pain  Skin: Rash, itching, lumps or bumps or moles that are changing, hair changes, nail changes.  Eyes: Visual blurring, double vision, spots before the eyes, eye pain.amorosis fugax,itching,redness, drainage  Ears: Decreased auditory acuity, ringing or tinnitus in the ears, pain in the ears, discharge from the ears.  Nose: Nosebleed, discharge from the nose, decrease in ability to smell.  Mouth: Soreness of the mouth or tongue or lips, abnormality of the teeth or gums.  Throat: Sore throat, hoarseness or voice change.  Neck: Stiffness or pain in the neck.  Cardiorespiratory: See history of present illness  Gastrointestinal: Abdominal pain, nausea, vomiting, constipation, diarrhea, black tarry stools, blood in the stools, change in the bowel habits,heartburn, indigestion,.jaundice, hernia  Genitourinary: See history of present illness Neurologic: Headache, weakness, loss of sensation, visual disturbance, trouble with balance or coordination, loss of memory,vertigo, dizziness, gait disorder, falls,tremor  Musculoskeletal: Joint pain, muscle pain.neck pain, back pain  Psychiatric: Symptoms of depression, feeling sad ,confusion, memory change, homicidal/suicidal ideation      PHYSICAL EXAM:    Blood pressure (!) 138/98, pulse 82, temperature 98.2 °F (36.8 °C), temperature source Tympanic, resp. rate 18, height 5' 8.75\" (1.746 m), weight 98 kg.  GENERAL APPEARANCE: Appears stated age, is in no apparent distress, is well developed and well nourished.  SKIN: Normal color for ethnicity, normal texture, good turgor, no skin rashes, no atypical-appearing skin lesions, no bruises.  LYMPH NODES: No cervical, supraclavicular, axillary or inguinal adenopathy.    HEAD: Normocephalic.    EYES: Pupils are equal and reactive to light and accommodation, extraocular movements are full, sclerae and conjunctivae are normal, lids and lashes are normal.  Fundi sharp disc with good venous pulsation bilaterally.   EARS: Pinnae and external ears are normal bilaterally, external auditory canals are normal, tympanic membranes are normal, there is no tragal tenderness, auditory acuity is grossly intact. Whisper test intact bilaterally   NOSE: External nose is normal to inspection, no septal deviation.  No nasal congestion.  No polyp.  MOUTH/THROAT: Tongue is midline and appears normal, oropharynx appears normal, soft palate and uvula are normal, oral mucosa is normal.  No lesions.  Dentition with restoration.  NECK: Neck is supple, no thyromegaly, trachea is midline.  No carotid bruits bilaterally.  No JVD.  No cervical adenopathy.  CHEST: Configuration normal, nontender to palpation, respiratory effort is not labored.  LUNGS: Lungs are clear to auscultation with normal unlabored inspiratory/expiratory sounds, no rales, no rhonchi, no wheezes.  CARDIOVASCULAR: Normal PMI, normal rate and rhythm, no palpable heaves or thrills, S1 and S2 normal, no S3 or S4, no murmurs, no extra heart sounds.   No carotid or abdominal bruits.  ABDOMEN: Abdomen is soft, normal active bowel sounds, nontender, without masses, without hepatomegaly or splenomegaly, no pulsatile masses. No abdominal aortic enlargement palpable.  No distention.  No Hernia. Surgical scar noted right lower quadrant    RECTAL: External anal opening appears normal, normal sphincter tone, no palpable internal anal or rectal masses, stool appears normal color and consistency.  Stool heme negative   prostate was 2+ enarged, spongy  nontender without nodule.    EXTREMITIES: No clubbing, no cyanosis, no edema, normal muscle tone and development bilaterally.   Pedal pulse 2+ bilaterally.   Cedric sign negative bilaterally.     BP  138/98    IMPRESSION/PLAN:  .    ICD-10-CM   1. Medicare annual wellness visit, subsequent. Monthly self scrotal exam. See dentist. See optometry  Z00.00   2. Coronary artery calcification seen on CAT scan. Nuclear stress test  I25.10   3. Other hyperlipidemia. Follow low-cholesterol and low-sodium diet  E78.4   4. BPH associated with nocturia N40.1    R35.1   5. Screening for prostate cancer Z12.5   6. Screening for colon cancer check stool Hemoccult. Colonoscopy 2018  Z12.11   7. Left inguinal hernia. Smile patient asymptomatic recommend general surgery evaluation if he becomes symptomatic or desires elective repair  K40.90   8.      Elevated blood pressure. Low-sodium diet. Blood pressure check with nurse 2 weeks May need to start medications  9.       Vaccinations: Prevnar 13 given. Tetanus vaccine 2018. Consider now released shingles vaccine when available  Check CMP lipid profile CBC PSA  Walk  Same other usual medications  See orders.   See Patient Instructions section.   Return in about 1 year (around 11/15/2018) for Medicare Wellness Visit.  Follow-up 12 months   independent

## 2024-01-01 NOTE — PROGRESS NOTE ADULT - PROBLEM SELECTOR PLAN 1
Pt p/w sob, cough and fever x~ 3days. Recently exposed to covid 1 week ago.   Pt was hypoxic to 90% on RA and put on 3L NC, sating 97% currently.  CXR showed some biltaeral infiltrates. WBC wnl. Currently does not meet SIRS criteria and Curb 65 score of 1    Plan:   -F/U ABG  -F/u RVP  -F/u Bx Cx and Sputum Cx  -C/w CTX and Azithromycin  -F/u urine strep/legionella  -C/w Duonebs prn and Tessalon perle   -Wean O2 as tolerated Pt p/w sob, cough and fever x~ 3days. Recently exposed to covid 1 week ago.   Pt was hypoxic to 90% on RA and put on 3L NC, sating 97% currently.  CXR showed some biltaeral infiltrates. WBC wnl. Currently does not meet SIRS criteria and Curb 65 score of 1  ABG WNL. RVP WNL. Urine strep/legionela negative.  Plan:   - Ambulatory sats after duonebs  -F/u Bx Cx and Sputum Cx  -C/w CTX and Azithromycin; would discharge on LEVAQUIN given bronchiectasis and c/f pseudomonal coverage  -C/w Duonebs prn and Tessalon perle   -Wean O2 as tolerated

## 2024-01-01 NOTE — PROGRESS NOTE ADULT - PROBLEM SELECTOR PLAN 3
Patient with Na of 130 on chemistry  -Likely in the setting of poor po intake     Plan:   -F/U Repeat BMP   -F/U urine lytes RESOLVED  Patient with Na of 130 on chemistry  -Likely in the setting of poor po intake

## 2024-01-01 NOTE — PHYSICAL THERAPY INITIAL EVALUATION ADULT - ADDITIONAL COMMENTS
[FreeTextEntry1] : rapid flu negative today
Active community ambulator (daily runner) who lives with his wife in elevator access apartment, no SUNIL. Independent with all ADLs prior and denies use of AD when ambulating.

## 2024-01-01 NOTE — OCCUPATIONAL THERAPY INITIAL EVALUATION ADULT - ADDITIONAL COMMENTS
Pt lives with his wife in an elevator access apartment. Pt reports that prior to admission, pt was independent in all ADLs and IADLs, and ambulates with no device. 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

## 2024-01-01 NOTE — PHYSICAL THERAPY INITIAL EVALUATION ADULT - GAIT DEVIATIONS NOTED, PT EVAL
Demo fairly steady gait, no LOB observed, good navigation of hallway obstacles/decreased valeriano/decreased velocity of limb motion/decreased step length/decreased stride length

## 2024-01-01 NOTE — PROGRESS NOTE ADULT - ATTENDING COMMENTS
73y Male with PMH of Afib (on eliquis) and MAC (disgnosed ~5 years and was on chronic abx until around 1.5 yrs ago) presenting to the ED with complaints of sob, cough and fever, found to be hypoxic on RA with bilateral infiltrates on CXR. Admitted for further management.     Labs and imaging reviewed  Hospital Record Reviewed    Problem List:  #Acute Hypoxic Resp Failure  #Pneumonia, Community Acquired  #Pneumonia, concern for Pseudomonal PNA  #Bronchiectasis  #MAC  #Chronic Afib    Plan  -Continue Ceftriaxone, pending final culture data for Gram negative bhupendra in sputum, concern for pseudomonas given Bronchiectasis  -Keenan  -May need treatment for bronchiectasis exacerbation if no improvement in O2 in morning    Rest as above

## 2024-01-01 NOTE — PROGRESS NOTE ADULT - PROBLEM SELECTOR PLAN 4
Patient has history of Afib  Home med: Eliquis 5 BID    Plan:  C/w home med Patient has history of Afib  Home med: Eliquis 5 BID    Plan:  - C/w eliquis 5mg BID

## 2024-01-01 NOTE — OCCUPATIONAL THERAPY INITIAL EVALUATION ADULT - LEVEL OF INDEPENDENCE: SIT/STAND, REHAB EVAL
Physical Therapy Initial Evaluation  Subjective:  The history is provided by the patient. No  was used.   Therapist Generated HPI Evaluation  Problem details: Left knee s/p ACL BTB after soccer injury on 11/1/21.         Type of problem:  Left knee.    This is a new (12/21/21) condition.  Condition occurred with:  A wrong landing.  Where condition occurred: during recreation/sport.  Patient reports pain:  Anterior.  Pain is described as aching and is intermittent.  Pain is the same all the time.  Since onset symptoms are gradually improving.  Associated symptoms:  Loss of motion/stiffness and loss of strength. Symptoms are exacerbated by ascending stairs, bending/squatting, descending stairs, weight bearing, running and walking  and relieved by ice.  Special tests included:  MRI and x-ray.  Previous treatment includes physical therapy and surgery. There was mild improvement following previous treatment.  Barriers include:  None as reported by patient.    Patient Health History  Anabelle Milan being seen for s/p ACL left.     Problem began: 12/21/2021.   Problem occurred: playing soccer   Pain is reported as 6/10 on pain scale.  General health as reported by patient is excellent.  Pertinent medical history includes: none.   Red flags:  None as reported by patient.  Medical allergies: none.    Other surgery history details: wisdom .    Current medications:  None.    Current occupation is [employer]. .   Primary job tasks include:  Computer work and prolonged sitting.                                    Objective:    Gait:    Gait Type:  Antalgic   Assistive Devices:  Brace and crutches                                                        Knee Evaluation:  ROM:    AROM    Hyperextension: Left:  0    Right:  Extension: Left:    Right:  2  Flexion: Left: 48    Right: 152        Strength:       Flexion:  Left: 4/5   Weak/pain free  Pain:      Right: 5/5   Strong/pain free  Pain:     Quad Set Left: Poor    Pain:   Quad Set Right: Good    Pain:        Edema:    Circumference:      Joint Line:  Left:  38 cm    Right:  33cm    Mobility Testing:  Normal                  General     ROS    Assessment/Plan:    Patient is a 18 year old female with left side knee complaints.    Patient has the following significant findings with corresponding treatment plan.                Diagnosis 1:  Left knee pain s/p ACL reconstruction  Pain -  hot/cold therapy, electric stimulation, manual therapy, self management, education and home program  Decreased ROM/flexibility - manual therapy, therapeutic exercise, therapeutic activity and home program  Decreased joint mobility - manual therapy, therapeutic exercise, therapeutic activity and home program  Decreased strength - therapeutic exercise, therapeutic activities and home program  Impaired gait - gait training and home program  Decreased function - therapeutic activities and home program    Therapy Evaluation Codes:   1) History comprised of:   Personal factors that impact the plan of care:      None.    Comorbidity factors that impact the plan of care are:      None.     Medications impacting care: None.  2) Examination of Body Systems comprised of:   Body structures and functions that impact the plan of care:      Knee.   Activity limitations that impact the plan of care are:      Sports, Squatting/kneeling, Stairs, Standing and Walking.  3) Clinical presentation characteristics are:   Stable/Uncomplicated.  4) Decision-Making    Low complexity using standardized patient assessment instrument and/or measureable assessment of functional outcome.  Cumulative Therapy Evaluation is: Low complexity.    Previous and current functional limitations:  (See Goal Flow Sheet for this information)    Short term and Long term goals: (See Goal Flow Sheet for this information)     Communication ability:  Patient appears to be able to clearly communicate and understand verbal  and written communication and follow directions correctly.  Treatment Explanation - The following has been discussed with the patient:   RX ordered/plan of care  Anticipated outcomes  Possible risks and side effects  This patient would benefit from PT intervention to resume normal activities.   Rehab potential is excellent.    Frequency:  2 X week, once daily  Duration:  for 3 weeks tapering to 1 X a week over 7 weeks, taper 2 x/ month   Discharge Plan:  Achieve all LTG.  Independent in home treatment program.  Reach maximal therapeutic benefit.    Please refer to the daily flowsheet for treatment today, total treatment time and time spent performing 1:1 timed codes.        independent

## 2024-01-01 NOTE — OCCUPATIONAL THERAPY INITIAL EVALUATION ADULT - DIAGNOSIS, OT EVAL
Pt presents with O2 desaturation with activity however no other deficits impacting his ability to independently complete ADLs, functional transfers, and functional mobility.

## 2024-01-01 NOTE — PROGRESS NOTE ADULT - PROBLEM SELECTOR PLAN 5
Patient with h/o MAC, was diagnosed ~ 5 years and treated with Rifampin, Ethambutol  and Azithromycin for ~1.5 yrs ago.   Patient follow with Dr. Arlyn Aguiar at [facility]. Last seen ~3 months ago per patient.     -Obtain further collateral from pt's Pulmonologist   -Follow up with outpatient. Patient with h/o MAC, was diagnosed ~ 5 years and treated with Rifampin, Ethambutol  and Azithromycin for ~1.5 yrs ago.   Patient follow with Dr. Arlyn Aguiar at [facility]. Last seen ~3 months ago per patient.     -Obtain further collateral from pt's Pulmonologist   -Follow up with outpatient.

## 2024-01-01 NOTE — PROGRESS NOTE ADULT - SUBJECTIVE AND OBJECTIVE BOX
OVERNIGHT EVENTS:    SUBJECTIVE:  Patient seen and examined at bedside.    Vital Signs Last 12 Hrs  T(F): 98.6 (01-01-24 @ 05:25), Max: 100 (12-31-23 @ 20:52)  HR: 82 (01-01-24 @ 05:25) (82 - 93)  BP: 130/85 (01-01-24 @ 05:25) (121/66 - 130/85)  BP(mean): 100 (01-01-24 @ 05:25) (85 - 100)  RR: 18 (01-01-24 @ 05:25) (18 - 18)  SpO2: 96% (01-01-24 @ 05:25) (95% - 96%)  I&O's Summary      PHYSICAL EXAM:  Constitutional: NAD, comfortable in bed.  HEENT: NC/AT, PERRLA, EOMI, no conjunctival pallor or scleral icterus, MMM  Neck: Supple, no JVD  Respiratory: CTA B/L. No w/r/r.   Cardiovascular: RRR, normal S1 and S2, no m/r/g.   Gastrointestinal: +BS, soft NTND, no guarding or rebound tenderness, no palpable masses   Extremities: wwp; no cyanosis, clubbing or edema.   Vascular: Pulses equal and strong throughout.   Neurological: AAOx3, no CN deficits, strength and sensation intact throughout.   Skin: No gross skin abnormalities or rashes        LABS:                        13.3   9.81  )-----------( 380      ( 31 Dec 2023 09:23 )             38.9     12-31    131<L>  |  95<L>  |  14  ----------------------------<  232<H>  4.1   |  24  |  0.70    Ca    7.8<L>      31 Dec 2023 11:10    TPro  6.2  /  Alb  2.4<L>  /  TBili  0.4  /  DBili  x   /  AST  30  /  ALT  19  /  AlkPhos  65  12-31      Urinalysis Basic - ( 31 Dec 2023 11:10 )    Color: x / Appearance: x / SG: x / pH: x  Gluc: 232 mg/dL / Ketone: x  / Bili: x / Urobili: x   Blood: x / Protein: x / Nitrite: x   Leuk Esterase: x / RBC: x / WBC x   Sq Epi: x / Non Sq Epi: x / Bacteria: x          RADIOLOGY & ADDITIONAL TESTS:    MEDICATIONS  (STANDING):  apixaban 5 milliGRAM(s) Oral every 12 hours  azithromycin   Tablet 500 milliGRAM(s) Oral every 24 hours  cefTRIAXone   IVPB 1000 milliGRAM(s) IV Intermittent every 24 hours    MEDICATIONS  (PRN):  acetaminophen     Tablet .. 650 milliGRAM(s) Oral every 6 hours PRN Temp greater or equal to 38C (100.4F), Mild Pain (1 - 3)  albuterol/ipratropium for Nebulization 3 milliLiter(s) Nebulizer every 6 hours PRN Shortness of Breath and/or Wheezing  benzonatate 100 milliGRAM(s) Oral every 8 hours PRN Cough  melatonin 3 milliGRAM(s) Oral at bedtime PRN Insomnia  ondansetron Injectable 4 milliGRAM(s) IV Push every 8 hours PRN Nausea and/or Vomiting   OVERNIGHT EVENTS: HAMLET    SUBJECTIVE:  Patient seen and examined at bedside. Coughing still, reports some fatigue and mild SOB on exertion but otherwise requesting to go home. Denies any other ROS including chills, chest pain. Has sputum which he placed in cup at bedside.     Vital Signs Last 12 Hrs  T(F): 98.6 (01-01-24 @ 05:25), Max: 100 (12-31-23 @ 20:52)  HR: 82 (01-01-24 @ 05:25) (82 - 93)  BP: 130/85 (01-01-24 @ 05:25) (121/66 - 130/85)  BP(mean): 100 (01-01-24 @ 05:25) (85 - 100)  RR: 18 (01-01-24 @ 05:25) (18 - 18)  SpO2: 96% (01-01-24 @ 05:25) (95% - 96%)  I&O's Summary      PHYSICAL EXAM:  Constitutional: NAD, comfortable in bed.  HEENT: NC/AT, PERRLA, EOMI, no conjunctival pallor or scleral icterus, MMM  Neck: Supple, no JVD  Respiratory: CTA B/L. No w/r/r.   Cardiovascular: RRR, normal S1 and S2, no m/r/g.   Gastrointestinal: +BS, soft NTND, no guarding or rebound tenderness, no palpable masses   Extremities: wwp; no cyanosis, clubbing or edema.   Vascular: Pulses equal and strong throughout.   Neurological: AAOx3, no CN deficits, strength and sensation intact throughout.   Skin: No gross skin abnormalities or rashes        LABS:                        13.3   9.81  )-----------( 380      ( 31 Dec 2023 09:23 )             38.9     12-31    131<L>  |  95<L>  |  14  ----------------------------<  232<H>  4.1   |  24  |  0.70    Ca    7.8<L>      31 Dec 2023 11:10    TPro  6.2  /  Alb  2.4<L>  /  TBili  0.4  /  DBili  x   /  AST  30  /  ALT  19  /  AlkPhos  65  12-31      Urinalysis Basic - ( 31 Dec 2023 11:10 )    Color: x / Appearance: x / SG: x / pH: x  Gluc: 232 mg/dL / Ketone: x  / Bili: x / Urobili: x   Blood: x / Protein: x / Nitrite: x   Leuk Esterase: x / RBC: x / WBC x   Sq Epi: x / Non Sq Epi: x / Bacteria: x          RADIOLOGY & ADDITIONAL TESTS:    MEDICATIONS  (STANDING):  apixaban 5 milliGRAM(s) Oral every 12 hours  azithromycin   Tablet 500 milliGRAM(s) Oral every 24 hours  cefTRIAXone   IVPB 1000 milliGRAM(s) IV Intermittent every 24 hours    MEDICATIONS  (PRN):  acetaminophen     Tablet .. 650 milliGRAM(s) Oral every 6 hours PRN Temp greater or equal to 38C (100.4F), Mild Pain (1 - 3)  albuterol/ipratropium for Nebulization 3 milliLiter(s) Nebulizer every 6 hours PRN Shortness of Breath and/or Wheezing  benzonatate 100 milliGRAM(s) Oral every 8 hours PRN Cough  melatonin 3 milliGRAM(s) Oral at bedtime PRN Insomnia  ondansetron Injectable 4 milliGRAM(s) IV Push every 8 hours PRN Nausea and/or Vomiting   OVERNIGHT EVENTS: HAMLET     SUBJECTIVE:  Patient seen and examined at bedside. Coughing still, reports some fatigue and mild SOB on exertion but otherwise requesting to go home. Denies any other ROS including chills, chest pain. Has sputum which he placed in cup at bedside.      Vital Signs Last 12 Hrs  T(F): 98.6 (01-01-24 @ 05:25), Max: 100 (12-31-23 @ 20:52)  HR: 82 (01-01-24 @ 05:25) (82 - 93)  BP: 130/85 (01-01-24 @ 05:25) (121/66 - 130/85)  BP(mean): 100 (01-01-24 @ 05:25) (85 - 100)  RR: 18 (01-01-24 @ 05:25) (18 - 18)  SpO2: 96% (01-01-24 @ 05:25) (95% - 96%)  I&O's Summary      PHYSICAL EXAM:  Constitutional: NAD, comfortable in bed.  HEENT: NC/AT, PERRLA, EOMI, no conjunctival pallor or scleral icterus, MMM  Neck: Supple, no JVD  Respiratory: Mild diffuse wheezing and rhonchi. No rales.   Cardiovascular: RRR, normal S1 and S2, no m/r/g.   Gastrointestinal: +BS, soft NTND, no guarding or rebound tenderness, no palpable masses   Extremities: wwp; no cyanosis, clubbing or edema.   Vascular: Pulses equal and strong throughout.   Neurological: AAOx3, no CN deficits, strength and sensation intact throughout.   Skin: No gross skin abnormalities or rashes      LABS:                        13.3   9.81  )-----------( 380      ( 31 Dec 2023 09:23 )             38.9     12-31    131<L>  |  95<L>  |  14  ----------------------------<  232<H>  4.1   |  24  |  0.70    Ca    7.8<L>      31 Dec 2023 11:10    TPro  6.2  /  Alb  2.4<L>  /  TBili  0.4  /  DBili  x   /  AST  30  /  ALT  19  /  AlkPhos  65  12-31      Urinalysis Basic - ( 31 Dec 2023 11:10 )    Color: x / Appearance: x / SG: x / pH: x  Gluc: 232 mg/dL / Ketone: x  / Bili: x / Urobili: x   Blood: x / Protein: x / Nitrite: x   Leuk Esterase: x / RBC: x / WBC x   Sq Epi: x / Non Sq Epi: x / Bacteria: x          RADIOLOGY & ADDITIONAL TESTS:    MEDICATIONS  (STANDING):  apixaban 5 milliGRAM(s) Oral every 12 hours  azithromycin   Tablet 500 milliGRAM(s) Oral every 24 hours  cefTRIAXone   IVPB 1000 milliGRAM(s) IV Intermittent every 24 hours    MEDICATIONS  (PRN):  acetaminophen     Tablet .. 650 milliGRAM(s) Oral every 6 hours PRN Temp greater or equal to 38C (100.4F), Mild Pain (1 - 3)  albuterol/ipratropium for Nebulization 3 milliLiter(s) Nebulizer every 6 hours PRN Shortness of Breath and/or Wheezing  benzonatate 100 milliGRAM(s) Oral every 8 hours PRN Cough  melatonin 3 milliGRAM(s) Oral at bedtime PRN Insomnia  ondansetron Injectable 4 milliGRAM(s) IV Push every 8 hours PRN Nausea and/or Vomiting

## 2024-01-02 LAB
ALBUMIN SERPL ELPH-MCNC: 2 G/DL — LOW (ref 3.3–5)
ALBUMIN SERPL ELPH-MCNC: 2 G/DL — LOW (ref 3.3–5)
ALP SERPL-CCNC: 80 U/L — SIGNIFICANT CHANGE UP (ref 40–120)
ALP SERPL-CCNC: 80 U/L — SIGNIFICANT CHANGE UP (ref 40–120)
ALT FLD-CCNC: 21 U/L — SIGNIFICANT CHANGE UP (ref 10–45)
ALT FLD-CCNC: 21 U/L — SIGNIFICANT CHANGE UP (ref 10–45)
ANION GAP SERPL CALC-SCNC: 6 MMOL/L — SIGNIFICANT CHANGE UP (ref 5–17)
ANION GAP SERPL CALC-SCNC: 6 MMOL/L — SIGNIFICANT CHANGE UP (ref 5–17)
ANISOCYTOSIS BLD QL: SLIGHT — SIGNIFICANT CHANGE UP
ANISOCYTOSIS BLD QL: SLIGHT — SIGNIFICANT CHANGE UP
AST SERPL-CCNC: 21 U/L — SIGNIFICANT CHANGE UP (ref 10–40)
AST SERPL-CCNC: 21 U/L — SIGNIFICANT CHANGE UP (ref 10–40)
BASOPHILS # BLD AUTO: 0 K/UL — SIGNIFICANT CHANGE UP (ref 0–0.2)
BASOPHILS # BLD AUTO: 0 K/UL — SIGNIFICANT CHANGE UP (ref 0–0.2)
BASOPHILS NFR BLD AUTO: 0 % — SIGNIFICANT CHANGE UP (ref 0–2)
BASOPHILS NFR BLD AUTO: 0 % — SIGNIFICANT CHANGE UP (ref 0–2)
BILIRUB SERPL-MCNC: 0.3 MG/DL — SIGNIFICANT CHANGE UP (ref 0.2–1.2)
BILIRUB SERPL-MCNC: 0.3 MG/DL — SIGNIFICANT CHANGE UP (ref 0.2–1.2)
BUN SERPL-MCNC: 10 MG/DL — SIGNIFICANT CHANGE UP (ref 7–23)
BUN SERPL-MCNC: 10 MG/DL — SIGNIFICANT CHANGE UP (ref 7–23)
BURR CELLS BLD QL SMEAR: PRESENT — SIGNIFICANT CHANGE UP
BURR CELLS BLD QL SMEAR: PRESENT — SIGNIFICANT CHANGE UP
CALCIUM SERPL-MCNC: 8.6 MG/DL — SIGNIFICANT CHANGE UP (ref 8.4–10.5)
CALCIUM SERPL-MCNC: 8.6 MG/DL — SIGNIFICANT CHANGE UP (ref 8.4–10.5)
CHLORIDE SERPL-SCNC: 98 MMOL/L — SIGNIFICANT CHANGE UP (ref 96–108)
CHLORIDE SERPL-SCNC: 98 MMOL/L — SIGNIFICANT CHANGE UP (ref 96–108)
CO2 SERPL-SCNC: 31 MMOL/L — SIGNIFICANT CHANGE UP (ref 22–31)
CO2 SERPL-SCNC: 31 MMOL/L — SIGNIFICANT CHANGE UP (ref 22–31)
CREAT SERPL-MCNC: 0.87 MG/DL — SIGNIFICANT CHANGE UP (ref 0.5–1.3)
CREAT SERPL-MCNC: 0.87 MG/DL — SIGNIFICANT CHANGE UP (ref 0.5–1.3)
CULTURE RESULTS: ABNORMAL
CULTURE RESULTS: ABNORMAL
EGFR: 91 ML/MIN/1.73M2 — SIGNIFICANT CHANGE UP
EGFR: 91 ML/MIN/1.73M2 — SIGNIFICANT CHANGE UP
EOSINOPHIL # BLD AUTO: 0.09 K/UL — SIGNIFICANT CHANGE UP (ref 0–0.5)
EOSINOPHIL # BLD AUTO: 0.09 K/UL — SIGNIFICANT CHANGE UP (ref 0–0.5)
EOSINOPHIL NFR BLD AUTO: 0.9 % — SIGNIFICANT CHANGE UP (ref 0–6)
EOSINOPHIL NFR BLD AUTO: 0.9 % — SIGNIFICANT CHANGE UP (ref 0–6)
GIANT PLATELETS BLD QL SMEAR: PRESENT — SIGNIFICANT CHANGE UP
GIANT PLATELETS BLD QL SMEAR: PRESENT — SIGNIFICANT CHANGE UP
GLUCOSE BLDC GLUCOMTR-MCNC: 134 MG/DL — HIGH (ref 70–99)
GLUCOSE BLDC GLUCOMTR-MCNC: 134 MG/DL — HIGH (ref 70–99)
GLUCOSE SERPL-MCNC: 117 MG/DL — HIGH (ref 70–99)
GLUCOSE SERPL-MCNC: 117 MG/DL — HIGH (ref 70–99)
HCT VFR BLD CALC: 38.9 % — LOW (ref 39–50)
HCT VFR BLD CALC: 38.9 % — LOW (ref 39–50)
HGB BLD-MCNC: 12.7 G/DL — LOW (ref 13–17)
HGB BLD-MCNC: 12.7 G/DL — LOW (ref 13–17)
LYMPHOCYTES # BLD AUTO: 1.21 K/UL — SIGNIFICANT CHANGE UP (ref 1–3.3)
LYMPHOCYTES # BLD AUTO: 1.21 K/UL — SIGNIFICANT CHANGE UP (ref 1–3.3)
LYMPHOCYTES # BLD AUTO: 12.3 % — LOW (ref 13–44)
LYMPHOCYTES # BLD AUTO: 12.3 % — LOW (ref 13–44)
MAGNESIUM SERPL-MCNC: 2.4 MG/DL — SIGNIFICANT CHANGE UP (ref 1.6–2.6)
MAGNESIUM SERPL-MCNC: 2.4 MG/DL — SIGNIFICANT CHANGE UP (ref 1.6–2.6)
MANUAL SMEAR VERIFICATION: SIGNIFICANT CHANGE UP
MANUAL SMEAR VERIFICATION: SIGNIFICANT CHANGE UP
MCHC RBC-ENTMCNC: 31.6 PG — SIGNIFICANT CHANGE UP (ref 27–34)
MCHC RBC-ENTMCNC: 31.6 PG — SIGNIFICANT CHANGE UP (ref 27–34)
MCHC RBC-ENTMCNC: 32.6 GM/DL — SIGNIFICANT CHANGE UP (ref 32–36)
MCHC RBC-ENTMCNC: 32.6 GM/DL — SIGNIFICANT CHANGE UP (ref 32–36)
MCV RBC AUTO: 96.8 FL — SIGNIFICANT CHANGE UP (ref 80–100)
MCV RBC AUTO: 96.8 FL — SIGNIFICANT CHANGE UP (ref 80–100)
METAMYELOCYTES # FLD: 0.9 % — HIGH (ref 0–0)
METAMYELOCYTES # FLD: 0.9 % — HIGH (ref 0–0)
MICROCYTES BLD QL: SLIGHT — SIGNIFICANT CHANGE UP
MICROCYTES BLD QL: SLIGHT — SIGNIFICANT CHANGE UP
MONOCYTES # BLD AUTO: 0.52 K/UL — SIGNIFICANT CHANGE UP (ref 0–0.9)
MONOCYTES # BLD AUTO: 0.52 K/UL — SIGNIFICANT CHANGE UP (ref 0–0.9)
MONOCYTES NFR BLD AUTO: 5.3 % — SIGNIFICANT CHANGE UP (ref 2–14)
MONOCYTES NFR BLD AUTO: 5.3 % — SIGNIFICANT CHANGE UP (ref 2–14)
NEUTROPHILS # BLD AUTO: 7.75 K/UL — HIGH (ref 1.8–7.4)
NEUTROPHILS # BLD AUTO: 7.75 K/UL — HIGH (ref 1.8–7.4)
NEUTROPHILS NFR BLD AUTO: 78.9 % — HIGH (ref 43–77)
NEUTROPHILS NFR BLD AUTO: 78.9 % — HIGH (ref 43–77)
OVALOCYTES BLD QL SMEAR: SLIGHT — SIGNIFICANT CHANGE UP
OVALOCYTES BLD QL SMEAR: SLIGHT — SIGNIFICANT CHANGE UP
PHOSPHATE SERPL-MCNC: 3.2 MG/DL — SIGNIFICANT CHANGE UP (ref 2.5–4.5)
PHOSPHATE SERPL-MCNC: 3.2 MG/DL — SIGNIFICANT CHANGE UP (ref 2.5–4.5)
PLAT MORPH BLD: ABNORMAL
PLAT MORPH BLD: ABNORMAL
PLATELET # BLD AUTO: 369 K/UL — SIGNIFICANT CHANGE UP (ref 150–400)
PLATELET # BLD AUTO: 369 K/UL — SIGNIFICANT CHANGE UP (ref 150–400)
POIKILOCYTOSIS BLD QL AUTO: SLIGHT — SIGNIFICANT CHANGE UP
POIKILOCYTOSIS BLD QL AUTO: SLIGHT — SIGNIFICANT CHANGE UP
POLYCHROMASIA BLD QL SMEAR: SLIGHT — SIGNIFICANT CHANGE UP
POLYCHROMASIA BLD QL SMEAR: SLIGHT — SIGNIFICANT CHANGE UP
POTASSIUM SERPL-MCNC: 4.1 MMOL/L — SIGNIFICANT CHANGE UP (ref 3.5–5.3)
POTASSIUM SERPL-MCNC: 4.1 MMOL/L — SIGNIFICANT CHANGE UP (ref 3.5–5.3)
POTASSIUM SERPL-SCNC: 4.1 MMOL/L — SIGNIFICANT CHANGE UP (ref 3.5–5.3)
POTASSIUM SERPL-SCNC: 4.1 MMOL/L — SIGNIFICANT CHANGE UP (ref 3.5–5.3)
PROT SERPL-MCNC: 6.2 G/DL — SIGNIFICANT CHANGE UP (ref 6–8.3)
PROT SERPL-MCNC: 6.2 G/DL — SIGNIFICANT CHANGE UP (ref 6–8.3)
RBC # BLD: 4.02 M/UL — LOW (ref 4.2–5.8)
RBC # BLD: 4.02 M/UL — LOW (ref 4.2–5.8)
RBC # FLD: 13.2 % — SIGNIFICANT CHANGE UP (ref 10.3–14.5)
RBC # FLD: 13.2 % — SIGNIFICANT CHANGE UP (ref 10.3–14.5)
RBC BLD AUTO: ABNORMAL
RBC BLD AUTO: ABNORMAL
SODIUM SERPL-SCNC: 135 MMOL/L — SIGNIFICANT CHANGE UP (ref 135–145)
SODIUM SERPL-SCNC: 135 MMOL/L — SIGNIFICANT CHANGE UP (ref 135–145)
SPECIMEN SOURCE: SIGNIFICANT CHANGE UP
SPECIMEN SOURCE: SIGNIFICANT CHANGE UP
VARIANT LYMPHS # BLD: 1.7 % — SIGNIFICANT CHANGE UP (ref 0–6)
VARIANT LYMPHS # BLD: 1.7 % — SIGNIFICANT CHANGE UP (ref 0–6)
WBC # BLD: 9.82 K/UL — SIGNIFICANT CHANGE UP (ref 3.8–10.5)
WBC # BLD: 9.82 K/UL — SIGNIFICANT CHANGE UP (ref 3.8–10.5)
WBC # FLD AUTO: 9.82 K/UL — SIGNIFICANT CHANGE UP (ref 3.8–10.5)
WBC # FLD AUTO: 9.82 K/UL — SIGNIFICANT CHANGE UP (ref 3.8–10.5)

## 2024-01-02 PROCEDURE — 71045 X-RAY EXAM CHEST 1 VIEW: CPT | Mod: 26

## 2024-01-02 PROCEDURE — 99232 SBSQ HOSP IP/OBS MODERATE 35: CPT | Mod: GC

## 2024-01-02 RX ADMIN — Medication 100 MILLIGRAM(S): at 18:39

## 2024-01-02 RX ADMIN — Medication 40 MILLIGRAM(S): at 13:33

## 2024-01-02 RX ADMIN — APIXABAN 5 MILLIGRAM(S): 2.5 TABLET, FILM COATED ORAL at 07:23

## 2024-01-02 RX ADMIN — CEFTRIAXONE 100 MILLIGRAM(S): 500 INJECTION, POWDER, FOR SOLUTION INTRAMUSCULAR; INTRAVENOUS at 11:17

## 2024-01-02 RX ADMIN — Medication 3 MILLILITER(S): at 07:23

## 2024-01-02 RX ADMIN — AZITHROMYCIN 500 MILLIGRAM(S): 500 TABLET, FILM COATED ORAL at 11:17

## 2024-01-02 RX ADMIN — Medication 100 MILLIGRAM(S): at 07:38

## 2024-01-02 RX ADMIN — Medication 100 MILLIGRAM(S): at 23:00

## 2024-01-02 RX ADMIN — APIXABAN 5 MILLIGRAM(S): 2.5 TABLET, FILM COATED ORAL at 18:39

## 2024-01-02 RX ADMIN — Medication 100 MILLIGRAM(S): at 11:16

## 2024-01-02 NOTE — PROGRESS NOTE ADULT - ASSESSMENT
Patient is a 73y Male with PMH of Afib (on eliquis) and MAC (disgnosed ~5 years and was on chronic abx until around 1.5 yrs ago) presenting to the ED with complaints of sob, cough and fever, found to be hypoxic on RA with bilateral infiltrates on CXR. Admitted for further management.    Patient is a 73y Male with PMH of Afib (on eliquis) and MAC (disgnosed ~5 years and was on chronic abx until around 1.5 yrs ago) presenting to the ED with complaints of sob, cough and fever, found to be hypoxic on RA with bilateral infiltrates on CXR admitted for further medical management and treatment of CAP.

## 2024-01-02 NOTE — PROVIDER CONTACT NOTE (OTHER) - SITUATION
Patient sat then lay on the floor. He wants to leave the hospital due to disruptive roommate and roommate's mother. Feels unwell.

## 2024-01-02 NOTE — PROGRESS NOTE ADULT - ATTENDING COMMENTS
Pt is 74 yo man with history of bronchiectasis, pulmonary MAC with therapy finishing a year ago, admitted with complains of cough and SOB. Pt was found to have diffuse wheezing on exam. Respiratory culture grew oral akhil. No fever or leukocytosis seen on BW. CXR did not show infiltrates.   ---will treat pt for obstructive lung disease with steroids and bronchodilators, likely reactive airway disease. Pt admits to feeling wheezy with physical exertion.   ---if pt feels better can be discharged home tomorrow Pt is 72 yo man with history of bronchiectasis, pulmonary MAC with therapy finishing a year ago, admitted with complains of cough and SOB. Pt was found to have diffuse wheezing on exam. Respiratory culture grew oral akhil. No fever or leukocytosis seen on BW. CXR did not show infiltrates.   ---will treat pt for obstructive lung disease with steroids and bronchodilators, likely reactive airway disease. Pt admits to feeling wheezy with physical exertion.   ---if pt feels better can be discharged home tomorrow

## 2024-01-02 NOTE — SBIRT NOTE ADULT - NSSBIRTALCPOSREINDET_GEN_A_CORE
Patient reports daily ETOH use prior to one week ago; would drink approximately 1-2 drinks/night of liquor or beer. Patient endorses no ETOH abuse. SW provided positive reinforcement.

## 2024-01-02 NOTE — PROGRESS NOTE ADULT - PROBLEM SELECTOR PLAN 3
RESOLVED  Patient with Na of 130 on chemistry  -Likely in the setting of poor po intake RESOLVED  Patient with Na of 130 on admission. Likely i/s/o poor PO intake. Now improved to 135.

## 2024-01-02 NOTE — PROVIDER CONTACT NOTE (OTHER) - ASSESSMENT
Patient sat then lay on the floor. He wants to leave the hospital due to disruptive roommate and roommate's mother. Feels unwell. BP= 124/83, HR= 93, RR=20, O2 sat=94% on room air and on 2 liters nc, T= 98.4.

## 2024-01-02 NOTE — CONSULT NOTE ADULT - ASSESSMENT
{\rtf1\pcioyp68668\ansi\tlsonfp2965\ftnbj\uc1\deff0  {\fonttbl{\f0 \fnil Segoe UI;}{\f1 \fnil \fcharset0 Segoe UI;}{\f2 \fnil Times [employer]quis;}}  {\colortbl ;\pce069\bswae641\noqg410 ;\red0\green0\blue0 ;\red0\green0\lahq478 ;\red0\green0\blue0 ;}  {\stylesheet{\f0\fs20 Normal;}{\cs1 Default Paragraph Font;}{\cs2\f0\fs16 Line Number;}{\cs3\f2\fs24\ul\cf3 Hyperlink;}}  {\*\revtbl{Unknown;}}  \bbrolx83234\hacjog33794\rrkpw5489\vzogo6953\xtdfh2860\sdrrf7817\dgbxoox796\ifcknsh359\nogrowautofit\mvcjpt773\formshade\nofeaturethrottle1\dntblnsbdb\fet4\aendnotes\aftnnrlc\pgbrdrhead\pgbrdrfoot  \sectd\alvrjq95344\mlvwtl99119\guttersxn0\wpgpzxtk4491\uubufjbu5082\yqfxxktz7818\vugpcaud9684\xylzopr812\tesfbfi916\sbkpage\pgncont\pgndec  \plain\plain\f0\fs24\ql\plain\f0\fs24\plain\f0\fs20\hoqd8069\hich\f0\dbch\f0\loch\f0\fs20\par  I M\par  \par  73 y o male with PMH of Afib (on eliquis) and MAC (disgnosed ~5 years and was on chronic abx until around 1.5 yrs ago) presenting to the ED with complaints of sob, cough and fever, found to be hypoxic on RA with bilateral infiltrates on CXR. Admitted   for further management. \par  \par  \plain\f1\fs20\bqul1729\hich\f1\dbch\f1\loch\f1\cf2\fs20\strike\plain\f1\fs20\czqs4523\hich\f1\dbch\f1\loch\f1\cf2\fs20\plain\f0\fs20\kcfi1905\hich\f0\dbch\f0\loch\f0\fs20\par  \plain\f1\fs20\dqfq4228\hich\f1\dbch\f1\loch\f1\cf2\fs20\ul{\field{\*\fldinst HYPERLINK [id number] }{\fldrslt Problem/Plan - 1:}}\plain\f0\fs20\whks0248\hich\f0\dbch\f0\loch\f0\fs20\ql\par  \'b7  {\*\bkmkstart wx90009424829}{\*\bkmkend mv75062242202}Problem: {\*\bkmkstart qh05350057632}{\*\bkmkend ng90976060701}Acute hypoxic respiratory failure. \par  \'b7  {\*\bkmkstart rz20209347928}{\*\bkmkend bs72400967580}Plan: {\*\bkmkstart jh49750029366}{\*\bkmkend oa94199893243}Pt p/w sob, cough and fever x~ 3days. Recently exposed to covid 1 week ago. \par  Pt was hypoxic to 90% on RA and put on 3L NC, sating 97% currently.\par  CXR showed some biltaeral infiltrates. WBC wnl. Currently does not meet SIRS criteria and Curb 65 score of 1\par  ABG WNL. RVP WNL. Urine strep/legionela negative.\par  Plan: \par  - Ambulatory sats after duonebs\par  -F/u Bx Cx and Sputum Cx\par  -C/w CTX and Azithromycin; would discharge on LEVAQUIN given bronchiectasis and c/f pseudomonal coverage\par  -C/w Duonebs prn and Tessalon perle \par  -Wean O2 as tolerated.\par  \par  \plain\f1\fs20\toju0200\hich\f1\dbch\f1\loch\f1\cf2\fs20\ul{\field{\*\fldinst HYPERLINK [id number] }{\fldrslt Problem/Plan - 2:}}\plain\f0\fs20\yaha4506\hich\f0\dbch\f0\loch\f0\fs20\ql\par  \'b7  {\*\bkmkstart dm04352911988}{\*\bkmkend gb44917544662}Problem: {\*\bkmkstart wb51246127872}{\*\bkmkend nz33423611654}Pneumonia. \par  \'b7  {\*\bkmkstart zh27519389439}{\*\bkmkend hv28560912373}Plan: {\*\bkmkstart bk32922838329}{\*\bkmkend jr11802376826}-Plan as above.\par  \par  \plain\f1\fs20\uazb6153\hich\f1\dbch\f1\loch\f1\cf2\fs20\ul{\field{\*\fldinst HYPERLINK [id number] }{\fldrslt Problem/Plan - 3:}}\plain\f0\fs20\ugya8611\hich\f0\dbch\f0\loch\f0\fs20\ql\par  \'b7  {\*\bkmkstart nd86459596945}{\*\bkmkend ww63907213727}Problem: {\*\bkmkstart kp66080232466}{\*\bkmkend pf09542573370}Hyponatremia. \par  \'b7  {\*\bkmkstart hw93639168451}{\*\bkmkend np80286174346}Plan: {\*\bkmkstart bx28816313658}{\*\bkmkend zh86756265299}RESOLVED\par  Patient with Na of 130 on chemistry\par  -Likely in the setting of poor po intake.\par  \par  \plain\f1\fs20\xjnc3409\hich\f1\dbch\f1\loch\f1\cf2\fs20\ul{\field{\*\fldinst HYPERLINK [id number] }{\fldrslt Problem/Plan - 4:}}\plain\f0\fs20\gtee1816\hich\f0\dbch\f0\loch\f0\fs20\ql\par  \'b7  {\*\bkmkstart is53709654023}{\*\bkmkend xv45277891363}Problem: {\*\bkmkstart db42613379473}{\*\bkmkend ce90960938888}Chronic atrial fibrillation. \par  \'b7  {\*\bkmkstart sg88709710311}{\*\bkmkend xu67949213883}Plan: {\*\bkmkstart hj19875973024}{\*\bkmkend lq6231950}Patient has history of Afib\par  Home med: Eliquis 5 BID\par  \par  Plan:\par  - C/w eliquis 5mg BID.\par  \par  \plain\f1\fs20\nuog4916\hich\f1\dbch\f1\loch\f1\cf2\fs20\ul{\field{\*\fldinst HYPERLINK [id number] }{\fldrslt Problem/Plan - 5:}}\plain\f0\fs20\pmtl2917\hich\f0\dbch\f0\loch\f0\fs20\ql\par  \'b7  {\*\bkmkstart vo35492677456}{\*\bkmkend ov55695254680}Problem: {\*\bkmkstart al31177070331}{\*\bkmkend qo96563587227}H/O Mycobacterium avium complex infection. \par  \'b7  {\*\bkmkstart gb69868560846}{\*\bkmkend qo99250833044}Plan: {\*\bkmkstart mz72811328125}{\*\bkmkend py95671013615}Patient with h/o MAC, was diagnosed ~ 5 years and treated with Rifampin, Ethambutol  and Azithromycin for ~1.5 yrs ago. \par  Patient follow with Dr. Arlyn Aguiar at [facility]. Last seen ~3 months ago per patient. \par  \par  -Obtain further collateral from pt's Pulmonologist \par  -Follow up with outpatient.\par  \par  \plain\f1\fs20\vkxc0346\hich\f1\dbch\f1\loch\f1\cf2\fs20\ul{\field{\*\fldinst HYPERLINK [id number] }{\fldrslt Problem/Plan - 6:}}\plain\f0\fs20\mrhs0582\hich\f0\dbch\f0\loch\f0\fs20\ql\par  \'b7  {\*\bkmkstart hy32336148112}{\*\bkmkend jt06871599713}Problem: {\*\bkmkstart qv04733703475}{\*\bkmkend lr90663802241}Prophylactic measure. \par  \'b7  {\*\bkmkstart nf39807452382}{\*\bkmkend ic57033641708}Plan: {\*\bkmkstart vs39773053515}{\*\bkmkend ov31316004249}F: S/P 1L bolus \par  E: Replete as needed\par  N: Regular \par  D: Eliquis\par  GI: NI\par  Code: FULL CODE.\par  \par  }   {\rtf1\hcobay03511\ansi\rcccljf0876\ftnbj\uc1\deff0  {\fonttbl{\f0 \fnil Segoe UI;}{\f1 \fnil \fcharset0 Segoe UI;}{\f2 \fnil Times [employer]quis;}}  {\colortbl ;\kel412\zcsdj724\ifow434 ;\red0\green0\blue0 ;\red0\green0\edlq253 ;\red0\green0\blue0 ;}  {\stylesheet{\f0\fs20 Normal;}{\cs1 Default Paragraph Font;}{\cs2\f0\fs16 Line Number;}{\cs3\f2\fs24\ul\cf3 Hyperlink;}}  {\*\revtbl{Unknown;}}  \uxvqhj78728\mwuoae25491\dqfwi8652\ayvia7818\zqxac0219\nwlbq6252\xiexmfk770\zgaricm963\nogrowautofit\rlxyqv173\formshade\nofeaturethrottle1\dntblnsbdb\fet4\aendnotes\aftnnrlc\pgbrdrhead\pgbrdrfoot  \sectd\qzjief21778\hfixbc68936\guttersxn0\gbkdtkuo0957\sqdzkblq2650\zdsswavf9999\myexfqis9885\clpunxh745\yyvlgqk073\sbkpage\pgncont\pgndec  \plain\plain\f0\fs24\ql\plain\f0\fs24\plain\f0\fs20\cbqm3798\hich\f0\dbch\f0\loch\f0\fs20\par  I M\par  \par  73 y o male with PMH of Afib (on eliquis) and MAC (disgnosed ~5 years and was on chronic abx until around 1.5 yrs ago) presenting to the ED with complaints of sob, cough and fever, found to be hypoxic on RA with bilateral infiltrates on CXR. Admitted   for further management. \par  \par  \plain\f1\fs20\tlzv1438\hich\f1\dbch\f1\loch\f1\cf2\fs20\strike\plain\f1\fs20\aefx8309\hich\f1\dbch\f1\loch\f1\cf2\fs20\plain\f0\fs20\blfz9381\hich\f0\dbch\f0\loch\f0\fs20\par  \plain\f1\fs20\curn9027\hich\f1\dbch\f1\loch\f1\cf2\fs20\ul{\field{\*\fldinst HYPERLINK [id number] }{\fldrslt Problem/Plan - 1:}}\plain\f0\fs20\cscu4506\hich\f0\dbch\f0\loch\f0\fs20\ql\par  \'b7  {\*\bkmkstart ea44871362335}{\*\bkmkend xn85836128912}Problem: {\*\bkmkstart zb05188525429}{\*\bkmkend ui09320682601}Acute hypoxic respiratory failure. \par  \'b7  {\*\bkmkstart so58573368218}{\*\bkmkend ly49397741732}Plan: {\*\bkmkstart pv01807097927}{\*\bkmkend cc71254859910}Pt p/w sob, cough and fever x~ 3days. Recently exposed to covid 1 week ago. \par  Pt was hypoxic to 90% on RA and put on 3L NC, sating 97% currently.\par  CXR showed some biltaeral infiltrates. WBC wnl. Currently does not meet SIRS criteria and Curb 65 score of 1\par  ABG WNL. RVP WNL. Urine strep/legionela negative.\par  Plan: \par  - Ambulatory sats after duonebs\par  -F/u Bx Cx and Sputum Cx\par  -C/w CTX and Azithromycin; would discharge on LEVAQUIN given bronchiectasis and c/f pseudomonal coverage\par  -C/w Duonebs prn and Tessalon perle \par  -Wean O2 as tolerated.\par  \par  \plain\f1\fs20\dcjs5460\hich\f1\dbch\f1\loch\f1\cf2\fs20\ul{\field{\*\fldinst HYPERLINK [id number] }{\fldrslt Problem/Plan - 2:}}\plain\f0\fs20\kjmk8954\hich\f0\dbch\f0\loch\f0\fs20\ql\par  \'b7  {\*\bkmkstart tf30499566885}{\*\bkmkend eo42451162868}Problem: {\*\bkmkstart ym50046485829}{\*\bkmkend eb01497919351}Pneumonia. \par  \'b7  {\*\bkmkstart bb09247688094}{\*\bkmkend mn87240422228}Plan: {\*\bkmkstart bc09842082961}{\*\bkmkend kv44903198247}-Plan as above.\par  \par  \plain\f1\fs20\vyra9336\hich\f1\dbch\f1\loch\f1\cf2\fs20\ul{\field{\*\fldinst HYPERLINK [id number] }{\fldrslt Problem/Plan - 3:}}\plain\f0\fs20\wkmo5898\hich\f0\dbch\f0\loch\f0\fs20\ql\par  \'b7  {\*\bkmkstart zb34568851681}{\*\bkmkend ru14916731192}Problem: {\*\bkmkstart ac57068962294}{\*\bkmkend uv12881569952}Hyponatremia. \par  \'b7  {\*\bkmkstart yj26547504379}{\*\bkmkend ds27311120733}Plan: {\*\bkmkstart no55208709432}{\*\bkmkend jw01811512359}RESOLVED\par  Patient with Na of 130 on chemistry\par  -Likely in the setting of poor po intake.\par  \par  \plain\f1\fs20\bkjf0842\hich\f1\dbch\f1\loch\f1\cf2\fs20\ul{\field{\*\fldinst HYPERLINK [id number] }{\fldrslt Problem/Plan - 4:}}\plain\f0\fs20\ebdb9769\hich\f0\dbch\f0\loch\f0\fs20\ql\par  \'b7  {\*\bkmkstart or69969602317}{\*\bkmkend zl73781866844}Problem: {\*\bkmkstart id52812740279}{\*\bkmkend al30546570354}Chronic atrial fibrillation. \par  \'b7  {\*\bkmkstart wn99707251684}{\*\bkmkend il84113433545}Plan: {\*\bkmkstart xh93953424091}{\*\bkmkend vx95356467130}Patient has history of Afib\par  Home med: Eliquis 5 BID\par  \par  Plan:\par  - C/w eliquis 5mg BID.\par  \par  \plain\f1\fs20\gbjz4905\hich\f1\dbch\f1\loch\f1\cf2\fs20\ul{\field{\*\fldinst HYPERLINK [id number] }{\fldrslt Problem/Plan - 5:}}\plain\f0\fs20\ecav5858\hich\f0\dbch\f0\loch\f0\fs20\ql\par  \'b7  {\*\bkmkstart xf91838033049}{\*\bkmkend si37989905069}Problem: {\*\bkmkstart vd50126261761}{\*\bkmkend tx40257415656}H/O Mycobacterium avium complex infection. \par  \'b7  {\*\bkmkstart iz60347315071}{\*\bkmkend vm30438650507}Plan: {\*\bkmkstart vu34962325415}{\*\bkmkend [id number]}Patient with h/o MAC, was diagnosed ~ 5 years and treated with Rifampin, Ethambutol  and Azithromycin for ~1.5 yrs ago. \par  Patient follow with Dr. Arlyn Aguiar at [facility]. Last seen ~3 months ago per patient. \par  \par  -Obtain further collateral from pt's Pulmonologist \par  -Follow up with outpatient.\par  \par  \plain\f1\fs20\gsyh5418\hich\f1\dbch\f1\loch\f1\cf2\fs20\ul{\field{\*\fldinst HYPERLINK [id number] }{\fldrslt Problem/Plan - 6:}}\plain\f0\fs20\crjb5111\hich\f0\dbch\f0\loch\f0\fs20\ql\par  \'b7  {\*\bkmkstart [id number]}{\*\bkmkend [id number]}Problem: {\*\bkmkstart [id number]}{\*\bkmkend [id number]}Prophylactic measure. \par  \'b7  {\*\bkmkstart [id number]}{\*\bkmkend [id number]}Plan: {\*\bkmkstart [id number]}{\*\bkmkend [id number]}F: S/P 1L bolus \par  E: Replete as needed\par  N: Regular \par  D: Eliquis\par  GI: NI\par  Code: FULL CODE.\par  \par  }

## 2024-01-02 NOTE — PROGRESS NOTE ADULT - PROBLEM SELECTOR PLAN 5
Patient with h/o MAC, was diagnosed ~ 5 years and treated with Rifampin, Ethambutol  and Azithromycin for ~1.5 yrs ago.   Patient follow with Dr. Arlyn Aguiar at [facility]. Last seen ~3 months ago per patient.     -Obtain further collateral from pt's Pulmonologist   -Follow up with outpatient. Patient with h/o MAC, was diagnosed ~ 5 years and treated with Rifampin, Ethambutol  and Azithromycin for ~1.5 yrs ago.   Patient follow with Dr. Arlyn Aguiar at [facility]. Last seen ~3 months ago per patient.     -Obtain further collateral from pt's Pulmonologist   -Follow up with outpatient. Patient with h/o MAC, was diagnosed ~ 5 years and treated with Rifampin, Ethambutol  and Azithromycin for ~1.5 yrs ago.   Patient follow with Dr. Arlyn Aguiar at [facility]. Last seen ~3 months ago per patient.   -Follow up with outpatient.

## 2024-01-02 NOTE — PROVIDER CONTACT NOTE (OTHER) - RECOMMENDATIONS
Patient for transfer to [address] [place] [address] [id number] Patient for transfer to [address] [place] [address] [phone]

## 2024-01-02 NOTE — PROVIDER CONTACT NOTE (OTHER) - ACTION/TREATMENT ORDERED:
Patient evaluated by Dr Shelby. Handoff report given to Veronica SAWYER and patient transferred to [address] [place] [address] Patient evaluated by Dr Shelby. Handoff report given to Veronica SAWYER and patient transferred to [address] [place] [address]

## 2024-01-02 NOTE — CHART NOTE - NSCHARTNOTEFT_GEN_A_CORE
At ~12:24 AM on 1/2/24 the patient proceeded to walk out of his room reporting being significantly uncomfortable with the amount of noise his room neighbor was causing. Nursing staff attempted to redirect him but regardless he got dressed and walked out of the room towards the elevator. After a short distance down the hallway the patient proceeded to calmly sit himself on the floor and then lay down. Covering intern Dr. Joseph Toledo came to evaluate the patient who informed he sat on the floor due to significant malaise and dizziness. The patient did not report SOB, chest pain, palpitations, LOC or feelings of incoming LOC, head trauma, or weakness. Patient was AAOx3 with vitals obtained on the scene remarkable only for an SpO2 of 91% on room air which promptly improved to >93% on NC 2L; rest of vitals were WNL and a FSG was 134. Bed board was contacted and a room was made available in 7W for transfer of the patient. After these arrangements the patient was agreeable to remain admitted under medical care.

## 2024-01-02 NOTE — CONSULT NOTE ADULT - SUBJECTIVE AND OBJECTIVE BOX
Patient is a 73y old  Male who presents with a chief complaint of Dyspnea, cough (01 Jan 2024 07:18)      HPI:  Patient is a 73y Male with PMH of Afib (on eliquis) and MAC diagnosed ~5 years and was on chronic abx until around 1.5 yrs ago) presenting to the ED with complaints of sob, cough and fever. Patient states his symptoms started 3 days when he had a temp of 102.4 and was coughing a lot. especially at night.  Pt is currently still feeling short of breathe and feelings of subjective fever. Reports he had dinner with a friend 1 week ago and the friend tested positive for Covid the next day, although he tested negative himself. Pt was also hypoxic to 90% on RA upon arrival and put on 3L NC sating 97%.  Denies runny nba, nasal congestion, abd pain, chest pain, palpitations, n/v, unexplained weight loss.     In the ED:  Initial vital signs: 97.4 T:  F, HR: 91, BP: 117/71, R: 20, SpO2: 97% on 3L NC  ED course:   Labs: significant for wbc wnl, Na 130   Imaging:  CXR: Bilateral infiltrates  EKG: Afib, vent rate 84  Medications: Duonebx x1, NS 1L bolus, CTX x1, Azithromycin 500 mg x1       (31 Dec 2023 11:48)    PAST MEDICAL & SURGICAL HISTORY:  Chronic atrial fibrillation      No significant past surgical history        MEDICATIONS  (STANDING):  apixaban 5 milliGRAM(s) Oral every 12 hours  azithromycin   Tablet 500 milliGRAM(s) Oral every 24 hours  cefTRIAXone   IVPB 1000 milliGRAM(s) IV Intermittent every 24 hours    MEDICATIONS  (PRN):  acetaminophen     Tablet .. 650 milliGRAM(s) Oral every 6 hours PRN Temp greater or equal to 38C (100.4F), Mild Pain (1 - 3)  albuterol/ipratropium for Nebulization 3 milliLiter(s) Nebulizer every 6 hours PRN Shortness of Breath and/or Wheezing  benzonatate 100 milliGRAM(s) Oral every 8 hours PRN Cough  hydrocodone/homatropine Syrup 5 milliLiter(s) Oral every 6 hours PRN Cough  melatonin 3 milliGRAM(s) Oral at bedtime PRN Insomnia  ondansetron Injectable 4 milliGRAM(s) IV Push every 8 hours PRN Nausea and/or Vomiting          Home Living Status :  lives with his wife in an elevator accessible apartment building           -  Prior Home Care Services :  none        Baseline Functional Level Prior to Admission :             - ADL's/ IADL's :  independent         - Ambulatory status prior to admission :   walked with no assistive devices          FAMILY HISTORY:  No pertinent family history in first degree relatives        CBC Full  -  ( 01 Jan 2024 05:30 )  WBC Count : 9.28 K/uL  RBC Count : 4.19 M/uL  Hemoglobin : 13.4 g/dL  Hematocrit : 40.4 %  Platelet Count - Automated : 325 K/uL  Mean Cell Volume : 96.4 fl  Mean Cell Hemoglobin : 32.0 pg  Mean Cell Hemoglobin Concentration : 33.2 gm/dL  Auto Neutrophil # : 7.09 K/uL  Auto Lymphocyte # : 1.25 K/uL  Auto Monocyte # : 0.79 K/uL  Auto Eosinophil # : 0.03 K/uL  Auto Basophil # : 0.04 K/uL  Auto Neutrophil % : 76.4 %  Auto Lymphocyte % : 13.5 %  Auto Monocyte % : 8.5 %  Auto Eosinophil % : 0.3 %  Auto Basophil % : 0.4 %      01-01    135  |  97  |  12  ----------------------------<  123<H>  3.8   |  27  |  0.72    Ca    9.0      01 Jan 2024 05:30  Phos  2.7     01-01  Mg     2.5     01-01    TPro  6.5  /  Alb  2.8<L>  /  TBili  0.3  /  DBili  x   /  AST  26  /  ALT  22  /  AlkPhos  121<H>  01-01      Urinalysis Basic - ( 01 Jan 2024 05:30 )    Color: x / Appearance: x / SG: x / pH: x  Gluc: 123 mg/dL / Ketone: x  / Bili: x / Urobili: x   Blood: x / Protein: x / Nitrite: x   Leuk Esterase: x / RBC: x / WBC x   Sq Epi: x / Non Sq Epi: x / Bacteria: x        Radiology :     < from: Xray Chest 1 View- PORTABLE-Routine (Xray Chest 1 View- PORTABLE-Routine in AM.) (01.02.24 @ 06:08) >  ACC: [id number] EXAM:  XR CHEST PORTABLE ROUTINE 1V   ORDERED BY: RADHA DELGADO     PROCEDURE DATE:  01/02/2024          INTERPRETATION:  Clinical history/reason for exam: Hypoxia.    Frontal chest.    COMPARISON: December 31, 2023.    Findings/  impression: Hyperinflation. Right apical opacity. Chest lordotic is   recommended to exclude a parenchymal nodule. Bilateral opacities,   unchanged.. Heart and mediastinum are unremarkable. Stable bony   structures.       Review of Systems : per HPI         Vital Signs Last 24 Hrs  T(C): 37.5 (02 Jan 2024 06:00), Max: 37.5 (01 Jan 2024 12:30)  T(F): 99.5 (02 Jan 2024 06:00), Max: 99.5 (01 Jan 2024 12:30)  HR: 73 (02 Jan 2024 06:00) (73 - 97)  BP: 122/68 (02 Jan 2024 06:00) (116/75 - 138/74)  BP(mean): 97 (02 Jan 2024 00:29) (88 - 97)  RR: 18 (02 Jan 2024 06:00) (18 - 20)  SpO2: 94% (02 Jan 2024 06:00) (91% - 94%)    Parameters below as of 02 Jan 2024 06:00  Patient On (Oxygen Delivery Method): nasal cannula  O2 Flow (L/min): 2          Physical Exam :   73 y o man lying comfortably in semi Garcia's position , awake , alert , no acute complaints , feels tired     Head : normocephalic , atraumatic    Eyes : PERRLA , EOMI , no nystagmus , sclera anicteric    ENT / FACE : neg nasal discharge , uvula midline , no oropharyngeal erythema / exudate    Neck : supple , negative JVD , negative carotid bruits , no thyromegaly    Chest : mild end exp wheezes    Cardiovascular : regular rate and rhythm , neg murmurs / rubs / gallops    Abdomen : soft , non distended , non tender to palpation in all 4 quadrants ,  normal bowel sounds     Extremities : WWP , neg cyanosis /clubbing / edema     Neurologic Exam :     Alert and oriented x 3        Motor Exam:                Right UE:                     no focal weakness ,  > 3+/5 throughout     Left UE:                       no focal weakness ,  > 3+/5 throughout       Right LE:          no focal weakness ,  > 3+/5 throughout          Left LE:          no focal weakness ,  > 3+/5 throughout           Sensation :         intact to light touch x 4 extremities                               DTR :           biceps/brachioradialis : equal                            patella/ankle : equal        Coordination :            Finger to Nose :  neg dysmetria bilaterally           Heel to Shin :               Gait :  not tested          PM&R Impression : admitted for PNA     - currently functionally independent        Recommendations / Plan :       1) Physical / Occupational therapy focusing on therapeutic exercises , equipment evaluation , bed mobility/transfer out of bed evaluation , progressive ambulation with assistive devices prn .    2) Current disposition plan recommendation  :      d/c home with no PT/OT needs        Patient is a 73y old  Male who presents with a chief complaint of Dyspnea, cough (01 Jan 2024 07:18)      HPI:  Patient is a 73y Male with PMH of Afib (on eliquis) and MAC diagnosed ~5 years and was on chronic abx until around 1.5 yrs ago) presenting to the ED with complaints of sob, cough and fever. Patient states his symptoms started 3 days when he had a temp of 102.4 and was coughing a lot. especially at night.  Pt is currently still feeling short of breathe and feelings of subjective fever. Reports he had dinner with a friend 1 week ago and the friend tested positive for Covid the next day, although he tested negative himself. Pt was also hypoxic to 90% on RA upon arrival and put on 3L NC sating 97%.  Denies runny nba, nasal congestion, abd pain, chest pain, palpitations, n/v, unexplained weight loss.     In the ED:  Initial vital signs: 97.4 T:  F, HR: 91, BP: 117/71, R: 20, SpO2: 97% on 3L NC  ED course:   Labs: significant for wbc wnl, Na 130   Imaging:  CXR: Bilateral infiltrates  EKG: Afib, vent rate 84  Medications: Duonebx x1, NS 1L bolus, CTX x1, Azithromycin 500 mg x1       (31 Dec 2023 11:48)    PAST MEDICAL & SURGICAL HISTORY:  Chronic atrial fibrillation      No significant past surgical history        MEDICATIONS  (STANDING):  apixaban 5 milliGRAM(s) Oral every 12 hours  azithromycin   Tablet 500 milliGRAM(s) Oral every 24 hours  cefTRIAXone   IVPB 1000 milliGRAM(s) IV Intermittent every 24 hours    MEDICATIONS  (PRN):  acetaminophen     Tablet .. 650 milliGRAM(s) Oral every 6 hours PRN Temp greater or equal to 38C (100.4F), Mild Pain (1 - 3)  albuterol/ipratropium for Nebulization 3 milliLiter(s) Nebulizer every 6 hours PRN Shortness of Breath and/or Wheezing  benzonatate 100 milliGRAM(s) Oral every 8 hours PRN Cough  hydrocodone/homatropine Syrup 5 milliLiter(s) Oral every 6 hours PRN Cough  melatonin 3 milliGRAM(s) Oral at bedtime PRN Insomnia  ondansetron Injectable 4 milliGRAM(s) IV Push every 8 hours PRN Nausea and/or Vomiting          Home Living Status :  lives with his wife in an elevator accessible apartment building           -  Prior Home Care Services :  none        Baseline Functional Level Prior to Admission :             - ADL's/ IADL's :  independent         - Ambulatory status prior to admission :   walked with no assistive devices          FAMILY HISTORY:  No pertinent family history in first degree relatives        CBC Full  -  ( 01 Jan 2024 05:30 )  WBC Count : 9.28 K/uL  RBC Count : 4.19 M/uL  Hemoglobin : 13.4 g/dL  Hematocrit : 40.4 %  Platelet Count - Automated : 325 K/uL  Mean Cell Volume : 96.4 fl  Mean Cell Hemoglobin : 32.0 pg  Mean Cell Hemoglobin Concentration : 33.2 gm/dL  Auto Neutrophil # : 7.09 K/uL  Auto Lymphocyte # : 1.25 K/uL  Auto Monocyte # : 0.79 K/uL  Auto Eosinophil # : 0.03 K/uL  Auto Basophil # : 0.04 K/uL  Auto Neutrophil % : 76.4 %  Auto Lymphocyte % : 13.5 %  Auto Monocyte % : 8.5 %  Auto Eosinophil % : 0.3 %  Auto Basophil % : 0.4 %      01-01    135  |  97  |  12  ----------------------------<  123<H>  3.8   |  27  |  0.72    Ca    9.0      01 Jan 2024 05:30  Phos  2.7     01-01  Mg     2.5     01-01    TPro  6.5  /  Alb  2.8<L>  /  TBili  0.3  /  DBili  x   /  AST  26  /  ALT  22  /  AlkPhos  121<H>  01-01      Urinalysis Basic - ( 01 Jan 2024 05:30 )    Color: x / Appearance: x / SG: x / pH: x  Gluc: 123 mg/dL / Ketone: x  / Bili: x / Urobili: x   Blood: x / Protein: x / Nitrite: x   Leuk Esterase: x / RBC: x / WBC x   Sq Epi: x / Non Sq Epi: x / Bacteria: x        Radiology :     < from: Xray Chest 1 View- PORTABLE-Routine (Xray Chest 1 View- PORTABLE-Routine in AM.) (01.02.24 @ 06:08) >  ACC: [id number] EXAM:  XR CHEST PORTABLE ROUTINE 1V   ORDERED BY: RADHA DELGADO     PROCEDURE DATE:  01/02/2024          INTERPRETATION:  Clinical history/reason for exam: Hypoxia.    Frontal chest.    COMPARISON: December 31, 2023.    Findings/  impression: Hyperinflation. Right apical opacity. Chest lordotic is   recommended to exclude a parenchymal nodule. Bilateral opacities,   unchanged.. Heart and mediastinum are unremarkable. Stable bony   structures.       Review of Systems : per HPI         Vital Signs Last 24 Hrs  T(C): 37.5 (02 Jan 2024 06:00), Max: 37.5 (01 Jan 2024 12:30)  T(F): 99.5 (02 Jan 2024 06:00), Max: 99.5 (01 Jan 2024 12:30)  HR: 73 (02 Jan 2024 06:00) (73 - 97)  BP: 122/68 (02 Jan 2024 06:00) (116/75 - 138/74)  BP(mean): 97 (02 Jan 2024 00:29) (88 - 97)  RR: 18 (02 Jan 2024 06:00) (18 - 20)  SpO2: 94% (02 Jan 2024 06:00) (91% - 94%)    Parameters below as of 02 Jan 2024 06:00  Patient On (Oxygen Delivery Method): nasal cannula  O2 Flow (L/min): 2          Physical Exam :   73 y o man lying comfortably in semi Garcia's position , awake , alert , no acute complaints , feels tired     Head : normocephalic , atraumatic    Eyes : PERRLA , EOMI , no nystagmus , sclera anicteric    ENT / FACE : neg nasal discharge , uvula midline , no oropharyngeal erythema / exudate    Neck : supple , negative JVD , negative carotid bruits , no thyromegaly    Chest : mild end exp wheezes    Cardiovascular : regular rate and rhythm , neg murmurs / rubs / gallops    Abdomen : soft , non distended , non tender to palpation in all 4 quadrants ,  normal bowel sounds     Extremities : WWP , neg cyanosis /clubbing / edema     Neurologic Exam :     Alert and oriented x 3        Motor Exam:                Right UE:                     no focal weakness ,  > 3+/5 throughout     Left UE:                       no focal weakness ,  > 3+/5 throughout       Right LE:          no focal weakness ,  > 3+/5 throughout          Left LE:          no focal weakness ,  > 3+/5 throughout           Sensation :         intact to light touch x 4 extremities                               DTR :           biceps/brachioradialis : equal                            patella/ankle : equal        Coordination :            Finger to Nose :  neg dysmetria bilaterally           Heel to Shin :               Gait :  not tested          PM&R Impression : admitted for PNA     - currently functionally independent        Recommendations / Plan :       1) Physical / Occupational therapy focusing on therapeutic exercises , equipment evaluation , bed mobility/transfer out of bed evaluation , progressive ambulation with assistive devices prn .    2) Current disposition plan recommendation  :      d/c home with no PT/OT needs

## 2024-01-02 NOTE — PROGRESS NOTE ADULT - PROBLEM SELECTOR PLAN 1
Pt p/w sob, cough and fever x~ 3days. Recently exposed to covid 1 week ago.   Pt was hypoxic to 90% on RA and put on 3L NC, sating 97% currently.  CXR showed some biltaeral infiltrates. WBC wnl. Currently does not meet SIRS criteria and Curb 65 score of 1  ABG WNL. RVP WNL. Urine strep/legionela negative.  Plan:   - Ambulatory sats after duonebs  -F/u Bx Cx and Sputum Cx  -C/w CTX and Azithromycin; would discharge on LEVAQUIN given bronchiectasis and c/f pseudomonal coverage  -C/w Duonebs prn and Tessalon perle   -Wean O2 as tolerated Pt p/w sob, cough and fever x~ 3days. Recently exposed to covid 1 week ago.   Pt was hypoxic to 90% on RA and put on 3L NC, satting 93% currently.  CXR showed bilateral infiltrates. WBC wnl. Currently does not meet SIRS criteria and Curb 65 score of 1  ABG WNL. RVP WNL. Urine strep/legionella negative. Blood cultures 12/31 with NGTD. Sputum culture growing normal respiratory akhil.   Plan:   -C/w CTX and Azithromycin; would discharge on levaquin given bronchiectasis and c/f pseudomonal coverage  -Consider CT chest if not improving clinically   -Start Robitussin 100mg q6h PRN  -C/w Duonebs prn and Tessalon perle   -Wean O2 as tolerated

## 2024-01-02 NOTE — PROGRESS NOTE ADULT - SUBJECTIVE AND OBJECTIVE BOX
OVERNIGHT EVENTS/INTERVAL HPI: Transferred to [address] [place] [address]. While walking out, had to lay in the floor 2/2 malaise and dizziness. Vitals WNL and FSG ~130s.    SUBJECTIVE: Patient seen and examined at bedside this morning, accompanied by wife Rebecca. States his dizziness has improved. He is still coughing and produced brownish sputum while obtaining history. He says his shortness of breath is improving. When talking with patient, NC had fallen off. Checked SpO2 on room air to be 90%. Placed on 3L NC with improvement in SpO2 to 93%.        OBJECTIVE:  Vital Signs Last 24 Hrs  T(C): 36.7 (02 Jan 2024 11:40), Max: 37.5 (01 Jan 2024 12:30)  T(F): 98.1 (02 Jan 2024 11:40), Max: 99.5 (01 Jan 2024 12:30)  HR: 89 (02 Jan 2024 11:40) (73 - 97)  BP: 124/67 (02 Jan 2024 11:40) (116/75 - 138/74)  BP(mean): 97 (02 Jan 2024 00:29) (88 - 97)  RR: 20 (02 Jan 2024 11:40) (18 - 20)  SpO2: 96% (02 Jan 2024 11:40) (90% - 96%)    Parameters below as of 02 Jan 2024 11:40  Patient On (Oxygen Delivery Method): nasal cannula  O2 Flow (L/min): 2    I&O's Detail    Physical Exam:  Constitutional: NAD, comfortable in bed.  HEENT: NC/AT, PERRLA, EOMI, no conjunctival pallor or scleral icterus, MMM  Neck: Supple, no JVD  Respiratory: Mild diffuse wheezing and rhonchi. No rales.   Cardiovascular: RRR, normal S1 and S2, no m/r/g.   Gastrointestinal: +BS, soft NTND, no guarding or rebound tenderness, no palpable masses   Extremities: wwp; no cyanosis, clubbing or edema.   Vascular: Pulses equal and strong throughout.   Neurological: AAOx3, no CN deficits, strength and sensation intact throughout.   Skin: No gross skin abnormalities or rashes    Medications:  MEDICATIONS  (STANDING):  apixaban 5 milliGRAM(s) Oral every 12 hours  azithromycin   Tablet 500 milliGRAM(s) Oral every 24 hours  cefTRIAXone   IVPB 1000 milliGRAM(s) IV Intermittent every 24 hours  guaiFENesin Oral Liquid (Sugar-Free) 100 milliGRAM(s) Oral every 6 hours    MEDICATIONS  (PRN):  acetaminophen     Tablet .. 650 milliGRAM(s) Oral every 6 hours PRN Temp greater or equal to 38C (100.4F), Mild Pain (1 - 3)  albuterol/ipratropium for Nebulization 3 milliLiter(s) Nebulizer every 6 hours PRN Shortness of Breath and/or Wheezing  benzonatate 100 milliGRAM(s) Oral every 8 hours PRN Cough  hydrocodone/homatropine Syrup 5 milliLiter(s) Oral every 6 hours PRN Cough  melatonin 3 milliGRAM(s) Oral at bedtime PRN Insomnia  ondansetron Injectable 4 milliGRAM(s) IV Push every 8 hours PRN Nausea and/or Vomiting      Labs:                        12.7   9.82  )-----------( 369      ( 02 Jan 2024 07:35 )             38.9     01-02    135  |  98  |  10  ----------------------------<  117<H>  4.1   |  31  |  0.87    Ca    8.6      02 Jan 2024 07:35  Phos  3.2     01-02  Mg     2.4     01-02    TPro  6.2  /  Alb  2.0<L>  /  TBili  0.3  /  DBili  x   /  AST  21  /  ALT  21  /  AlkPhos  80  01-02      ABG - ( 31 Dec 2023 13:18 )  pH, Arterial: 7.44  pH, Blood: x     /  pCO2: 40    /  pO2: 98    / HCO3: 27    / Base Excess: 2.8   /  SaO2: 99.2        Urinalysis Basic - ( 02 Jan 2024 07:35 )    Color: x / Appearance: x / SG: x / pH: x  Gluc: 117 mg/dL / Ketone: x  / Bili: x / Urobili: x   Blood: x / Protein: x / Nitrite: x   Leuk Esterase: x / RBC: x / WBC x   Sq Epi: x / Non Sq Epi: x / Bacteria: x      SARS-CoV-2: NotDetec (31 Dec 2023 09:23)      Radiology: Reviewed OVERNIGHT EVENTS/INTERVAL HPI: Transferred to [address] [place] [address]. While walking out, had to lay in the floor 2/2 malaise and dizziness. Vitals WNL and FSG ~130s.    SUBJECTIVE: Patient seen and examined at bedside this morning, accompanied by wife Rebecca. States his dizziness has improved. He is still coughing and produced brownish sputum while obtaining history. He says his shortness of breath is improving. When talking with patient, NC had fallen off. Checked SpO2 on room air to be 90%. Placed on 3L NC with improvement in SpO2 to 93%.        OBJECTIVE:  Vital Signs Last 24 Hrs  T(C): 36.7 (02 Jan 2024 11:40), Max: 37.5 (01 Jan 2024 12:30)  T(F): 98.1 (02 Jan 2024 11:40), Max: 99.5 (01 Jan 2024 12:30)  HR: 89 (02 Jan 2024 11:40) (73 - 97)  BP: 124/67 (02 Jan 2024 11:40) (116/75 - 138/74)  BP(mean): 97 (02 Jan 2024 00:29) (88 - 97)  RR: 20 (02 Jan 2024 11:40) (18 - 20)  SpO2: 96% (02 Jan 2024 11:40) (90% - 96%)    Parameters below as of 02 Jan 2024 11:40  Patient On (Oxygen Delivery Method): nasal cannula  O2 Flow (L/min): 2    I&O's Detail    Physical Exam:  Constitutional: NAD, comfortable in bed.  HEENT: NC/AT, PERRLA, EOMI, no conjunctival pallor or scleral icterus, MMM  Neck: Supple, no JVD  Respiratory: Mild diffuse wheezing and rhonchi. No rales.   Cardiovascular: RRR, normal S1 and S2, no m/r/g.   Gastrointestinal: +BS, soft NTND, no guarding or rebound tenderness, no palpable masses   Extremities: wwp; no cyanosis, clubbing or edema.   Vascular: Pulses equal and strong throughout.   Neurological: AAOx3, no CN deficits, strength and sensation intact throughout.   Skin: No gross skin abnormalities or rashes    Medications:  MEDICATIONS  (STANDING):  apixaban 5 milliGRAM(s) Oral every 12 hours  azithromycin   Tablet 500 milliGRAM(s) Oral every 24 hours  cefTRIAXone   IVPB 1000 milliGRAM(s) IV Intermittent every 24 hours  guaiFENesin Oral Liquid (Sugar-Free) 100 milliGRAM(s) Oral every 6 hours    MEDICATIONS  (PRN):  acetaminophen     Tablet .. 650 milliGRAM(s) Oral every 6 hours PRN Temp greater or equal to 38C (100.4F), Mild Pain (1 - 3)  albuterol/ipratropium for Nebulization 3 milliLiter(s) Nebulizer every 6 hours PRN Shortness of Breath and/or Wheezing  benzonatate 100 milliGRAM(s) Oral every 8 hours PRN Cough  hydrocodone/homatropine Syrup 5 milliLiter(s) Oral every 6 hours PRN Cough  melatonin 3 milliGRAM(s) Oral at bedtime PRN Insomnia  ondansetron Injectable 4 milliGRAM(s) IV Push every 8 hours PRN Nausea and/or Vomiting      Labs:                        12.7   9.82  )-----------( 369      ( 02 Jan 2024 07:35 )             38.9     01-02    135  |  98  |  10  ----------------------------<  117<H>  4.1   |  31  |  0.87    Ca    8.6      02 Jan 2024 07:35  Phos  3.2     01-02  Mg     2.4     01-02    TPro  6.2  /  Alb  2.0<L>  /  TBili  0.3  /  DBili  x   /  AST  21  /  ALT  21  /  AlkPhos  80  01-02      ABG - ( 31 Dec 2023 13:18 )  pH, Arterial: 7.44  pH, Blood: x     /  pCO2: 40    /  pO2: 98    / HCO3: 27    / Base Excess: 2.8   /  SaO2: 99.2        Urinalysis Basic - ( 02 Jan 2024 07:35 )    Color: x / Appearance: x / SG: x / pH: x  Gluc: 117 mg/dL / Ketone: x  / Bili: x / Urobili: x   Blood: x / Protein: x / Nitrite: x   Leuk Esterase: x / RBC: x / WBC x   Sq Epi: x / Non Sq Epi: x / Bacteria: x      SARS-CoV-2: NotDetec (31 Dec 2023 09:23)      Radiology: Reviewed

## 2024-01-02 NOTE — PROGRESS NOTE ADULT - PROBLEM SELECTOR PLAN 2
-Plan as above
Patient is a 38 y/o male with history of drug/tobacco/alcohol use presenting for evaluation of chest pain in setting of cocaine use.
-Plan as above

## 2024-01-03 VITALS
RESPIRATION RATE: 18 BRPM | SYSTOLIC BLOOD PRESSURE: 136 MMHG | OXYGEN SATURATION: 94 % | TEMPERATURE: 98 F | DIASTOLIC BLOOD PRESSURE: 72 MMHG | HEART RATE: 73 BPM

## 2024-01-03 LAB
ANION GAP SERPL CALC-SCNC: 9 MMOL/L — SIGNIFICANT CHANGE UP (ref 5–17)
ANION GAP SERPL CALC-SCNC: 9 MMOL/L — SIGNIFICANT CHANGE UP (ref 5–17)
BLD GP AB SCN SERPL QL: NEGATIVE — SIGNIFICANT CHANGE UP
BLD GP AB SCN SERPL QL: NEGATIVE — SIGNIFICANT CHANGE UP
BUN SERPL-MCNC: 14 MG/DL — SIGNIFICANT CHANGE UP (ref 7–23)
BUN SERPL-MCNC: 14 MG/DL — SIGNIFICANT CHANGE UP (ref 7–23)
CALCIUM SERPL-MCNC: 8.9 MG/DL — SIGNIFICANT CHANGE UP (ref 8.4–10.5)
CALCIUM SERPL-MCNC: 8.9 MG/DL — SIGNIFICANT CHANGE UP (ref 8.4–10.5)
CHLORIDE SERPL-SCNC: 98 MMOL/L — SIGNIFICANT CHANGE UP (ref 96–108)
CHLORIDE SERPL-SCNC: 98 MMOL/L — SIGNIFICANT CHANGE UP (ref 96–108)
CO2 SERPL-SCNC: 30 MMOL/L — SIGNIFICANT CHANGE UP (ref 22–31)
CO2 SERPL-SCNC: 30 MMOL/L — SIGNIFICANT CHANGE UP (ref 22–31)
CREAT SERPL-MCNC: 0.75 MG/DL — SIGNIFICANT CHANGE UP (ref 0.5–1.3)
CREAT SERPL-MCNC: 0.75 MG/DL — SIGNIFICANT CHANGE UP (ref 0.5–1.3)
EGFR: 95 ML/MIN/1.73M2 — SIGNIFICANT CHANGE UP
EGFR: 95 ML/MIN/1.73M2 — SIGNIFICANT CHANGE UP
GLUCOSE SERPL-MCNC: 119 MG/DL — HIGH (ref 70–99)
GLUCOSE SERPL-MCNC: 119 MG/DL — HIGH (ref 70–99)
HCT VFR BLD CALC: 39.6 % — SIGNIFICANT CHANGE UP (ref 39–50)
HCT VFR BLD CALC: 39.6 % — SIGNIFICANT CHANGE UP (ref 39–50)
HGB BLD-MCNC: 13.2 G/DL — SIGNIFICANT CHANGE UP (ref 13–17)
HGB BLD-MCNC: 13.2 G/DL — SIGNIFICANT CHANGE UP (ref 13–17)
MAGNESIUM SERPL-MCNC: 2.2 MG/DL — SIGNIFICANT CHANGE UP (ref 1.6–2.6)
MAGNESIUM SERPL-MCNC: 2.2 MG/DL — SIGNIFICANT CHANGE UP (ref 1.6–2.6)
MCHC RBC-ENTMCNC: 31.4 PG — SIGNIFICANT CHANGE UP (ref 27–34)
MCHC RBC-ENTMCNC: 31.4 PG — SIGNIFICANT CHANGE UP (ref 27–34)
MCHC RBC-ENTMCNC: 33.3 GM/DL — SIGNIFICANT CHANGE UP (ref 32–36)
MCHC RBC-ENTMCNC: 33.3 GM/DL — SIGNIFICANT CHANGE UP (ref 32–36)
MCV RBC AUTO: 94.1 FL — SIGNIFICANT CHANGE UP (ref 80–100)
MCV RBC AUTO: 94.1 FL — SIGNIFICANT CHANGE UP (ref 80–100)
NRBC # BLD: 0 /100 WBCS — SIGNIFICANT CHANGE UP (ref 0–0)
NRBC # BLD: 0 /100 WBCS — SIGNIFICANT CHANGE UP (ref 0–0)
PHOSPHATE SERPL-MCNC: 3.3 MG/DL — SIGNIFICANT CHANGE UP (ref 2.5–4.5)
PHOSPHATE SERPL-MCNC: 3.3 MG/DL — SIGNIFICANT CHANGE UP (ref 2.5–4.5)
PLATELET # BLD AUTO: 452 K/UL — HIGH (ref 150–400)
PLATELET # BLD AUTO: 452 K/UL — HIGH (ref 150–400)
POTASSIUM SERPL-MCNC: 4.1 MMOL/L — SIGNIFICANT CHANGE UP (ref 3.5–5.3)
POTASSIUM SERPL-MCNC: 4.1 MMOL/L — SIGNIFICANT CHANGE UP (ref 3.5–5.3)
POTASSIUM SERPL-SCNC: 4.1 MMOL/L — SIGNIFICANT CHANGE UP (ref 3.5–5.3)
POTASSIUM SERPL-SCNC: 4.1 MMOL/L — SIGNIFICANT CHANGE UP (ref 3.5–5.3)
RBC # BLD: 4.21 M/UL — SIGNIFICANT CHANGE UP (ref 4.2–5.8)
RBC # BLD: 4.21 M/UL — SIGNIFICANT CHANGE UP (ref 4.2–5.8)
RBC # FLD: 12.9 % — SIGNIFICANT CHANGE UP (ref 10.3–14.5)
RBC # FLD: 12.9 % — SIGNIFICANT CHANGE UP (ref 10.3–14.5)
RH IG SCN BLD-IMP: POSITIVE — SIGNIFICANT CHANGE UP
RH IG SCN BLD-IMP: POSITIVE — SIGNIFICANT CHANGE UP
SODIUM SERPL-SCNC: 137 MMOL/L — SIGNIFICANT CHANGE UP (ref 135–145)
SODIUM SERPL-SCNC: 137 MMOL/L — SIGNIFICANT CHANGE UP (ref 135–145)
WBC # BLD: 10.99 K/UL — HIGH (ref 3.8–10.5)
WBC # BLD: 10.99 K/UL — HIGH (ref 3.8–10.5)
WBC # FLD AUTO: 10.99 K/UL — HIGH (ref 3.8–10.5)
WBC # FLD AUTO: 10.99 K/UL — HIGH (ref 3.8–10.5)

## 2024-01-03 PROCEDURE — 83935 ASSAY OF URINE OSMOLALITY: CPT

## 2024-01-03 PROCEDURE — 99285 EMERGENCY DEPT VISIT HI MDM: CPT | Mod: 25

## 2024-01-03 PROCEDURE — 85027 COMPLETE CBC AUTOMATED: CPT

## 2024-01-03 PROCEDURE — 86803 HEPATITIS C AB TEST: CPT

## 2024-01-03 PROCEDURE — 86900 BLOOD TYPING SEROLOGIC ABO: CPT

## 2024-01-03 PROCEDURE — 86901 BLOOD TYPING SEROLOGIC RH(D): CPT

## 2024-01-03 PROCEDURE — 82570 ASSAY OF URINE CREATININE: CPT

## 2024-01-03 PROCEDURE — 86850 RBC ANTIBODY SCREEN: CPT

## 2024-01-03 PROCEDURE — 87040 BLOOD CULTURE FOR BACTERIA: CPT

## 2024-01-03 PROCEDURE — 80048 BASIC METABOLIC PNL TOTAL CA: CPT

## 2024-01-03 PROCEDURE — 93005 ELECTROCARDIOGRAM TRACING: CPT

## 2024-01-03 PROCEDURE — 99239 HOSP IP/OBS DSCHRG MGMT >30: CPT | Mod: GC

## 2024-01-03 PROCEDURE — 85025 COMPLETE CBC W/AUTO DIFF WBC: CPT

## 2024-01-03 PROCEDURE — 97161 PT EVAL LOW COMPLEX 20 MIN: CPT

## 2024-01-03 PROCEDURE — 84133 ASSAY OF URINE POTASSIUM: CPT

## 2024-01-03 PROCEDURE — 87899 AGENT NOS ASSAY W/OPTIC: CPT

## 2024-01-03 PROCEDURE — 80053 COMPREHEN METABOLIC PANEL: CPT

## 2024-01-03 PROCEDURE — 0225U NFCT DS DNA&RNA 21 SARSCOV2: CPT

## 2024-01-03 PROCEDURE — 36415 COLL VENOUS BLD VENIPUNCTURE: CPT

## 2024-01-03 PROCEDURE — 82803 BLOOD GASES ANY COMBINATION: CPT

## 2024-01-03 PROCEDURE — 84300 ASSAY OF URINE SODIUM: CPT

## 2024-01-03 PROCEDURE — 82962 GLUCOSE BLOOD TEST: CPT

## 2024-01-03 PROCEDURE — 97166 OT EVAL MOD COMPLEX 45 MIN: CPT

## 2024-01-03 PROCEDURE — 83735 ASSAY OF MAGNESIUM: CPT

## 2024-01-03 PROCEDURE — 94640 AIRWAY INHALATION TREATMENT: CPT

## 2024-01-03 PROCEDURE — 84100 ASSAY OF PHOSPHORUS: CPT

## 2024-01-03 PROCEDURE — 71045 X-RAY EXAM CHEST 1 VIEW: CPT

## 2024-01-03 PROCEDURE — 84156 ASSAY OF PROTEIN URINE: CPT

## 2024-01-03 PROCEDURE — 87070 CULTURE OTHR SPECIMN AEROBIC: CPT

## 2024-01-03 PROCEDURE — 87449 NOS EACH ORGANISM AG IA: CPT

## 2024-01-03 RX ORDER — ALBUTEROL 90 UG/1
2 AEROSOL, METERED ORAL
Qty: 1 | Refills: 2
Start: 2024-01-03 | End: 2024-02-13

## 2024-01-03 RX ORDER — APIXABAN 2.5 MG/1
1 TABLET, FILM COATED ORAL
Qty: 0 | Refills: 0 | DISCHARGE

## 2024-01-03 RX ADMIN — APIXABAN 5 MILLIGRAM(S): 2.5 TABLET, FILM COATED ORAL at 05:02

## 2024-01-03 RX ADMIN — Medication 100 MILLIGRAM(S): at 11:35

## 2024-01-03 RX ADMIN — Medication 40 MILLIGRAM(S): at 12:02

## 2024-01-03 RX ADMIN — Medication 100 MILLIGRAM(S): at 05:02

## 2024-01-03 NOTE — DISCHARGE NOTE PROVIDER - NSDCCPCAREPLAN_GEN_ALL_CORE_FT
PRINCIPAL DISCHARGE DIAGNOSIS  Diagnosis: Hypoxia  Assessment and Plan of Treatment: You came to the hospital for shortness of breath and fevers at home and were initially treated with antibiotics for a possible pneumonia, as well as supplemental oxygen. However, your chest xray was not consistent with pneumonia, and your symptoms improved with supplemental oxygen, cough medicine and steroids.  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 You were therefore treated with Prednisone (an oral steroid) to reduce inflammation within the lungs, as well as inhaled Albuterol, which helped to enable adequate airflow through the damaged airways. Given that these interventions helped to improve your symptoms, you likely have bronchiectasis, a lung condition where your airways (tubes going into your lungs) become damaged and widened, which is likely the result of your prior BESSIE infection. The xray of your chest also showed evidence of this condition. Damaged airways cannot clear mucus like they are supposed to, which can then lead to bacterial growth within the mucus, which can cause more inflammation and damage to your lungs. This makes you cough a lot as your body tries to remove the infected mucus. You were treated with oral steroids (Prednisone) and Albuterol to reduce spasms and enable adequate airflow through the airways.  You are being discharged on Prednisone and you should take 2 pills once a day for the next 3 days to complete a 3-day course of steroids. You are also being discharged with an Albuterol inhaler than you should continue to use up to 4 times a day as needed for wheezing or shortness of breath. You can pick both of these up from [facility] in the hospital lobby.  Please schedule an appointment with your pulmonologist to follow up on your lung function.

## 2024-01-03 NOTE — DISCHARGE NOTE PROVIDER - NSDCFUADDAPPT_GEN_ALL_CORE_FT
Please see your primary care doctor within 2 weeks of discharge.  Please schedule an appointment with your pulmonologist for within 1 week of discharge so he can examine your lungs and monitor your breathing after your recent hospitalization.

## 2024-01-03 NOTE — DISCHARGE NOTE PROVIDER - HOSPITAL COURSE
#Discharge: do not delete    MARIANA WILLETT is a 73y Male with a past medical history of _____    Presented with _____, found to have _____    Problem List/Main Diagnoses (system-based):   #     #     #    Patient was discharged to: (home/ANA/acute rehab/hospice, etc. and w/ home health/home PT/RN/home O2)    New medications:   Changes to old medications:  Medications that were stopped:    Items to follow up as outpatient:    Physical exam at the time of discharge:       LABS & STUDIES:  SARS-CoV-2: NotDetec (31 Dec 2023 09:23)   #Discharge: do not delete    MARIANA WILLETT is a 73y Male with a PMHx of AF and BESSIE (s/p tx 1.5y ago) presented for cough, SOB and fever at home, admitted for CAP treatment, course c/b AHRF necessitating supplemental O2.    Problem List/Main Diagnoses (system-based):     #Acute hypoxic respiratory failure.   Presented with shortness of breath, cough and fever x~ 3days. Recently exposed to covid 1 week ago but RVP and COVID negative.   Pt was hypoxic to 90% on RA and put on 3L NC. Empirically started on CAP treatment of IV CTX 1g QD and azithromycin 500mg PO QD. CXR showed bilateral infiltrates without lobar consolidation. WBC wnl. Currently does not meet SIRS criteria and Curb 65 score of 1. Antibiotics discontinued after 3 days.   ABG WNL. Urine strep/legionella negative. Blood cultures 12/31 with NGTD. Sputum culture growing normal respiratory akhil. Weaned off NC with SpO2 95% on RA at time of discharge. Improved physical exam with significantly reduced wheezing on auscultation.   Plan:   -Continue prednisone 40mg for a total 5-day course from 01/02-01/06  -Start albuterol inhaler PRN   -Monitor off antibiotics     #Hyponatremia.   Patient with Na of 130 on admission. Likely i/s/o poor PO intake. Improved to Na 137 at time of discharge.   -Resolved     #Chronic atrial fibrillation.   Patient has history of Afib. On home med: Eliquis 5 BID  -C/w home meds .    #H/O Mycobacterium avium complex infection.   Patient with h/o MAC, was diagnosed ~ 5 years and treated with Rifampin, Ethambutol  and Azithromycin for ~1.5 yrs ago. Likely residual scarring present from h/o infection. Treated with prednisone and DuoNebs as needed inpatient with clinical improvement and marked improvement in diffuse wheezing on ausculation.   - Continue with prednisone and albuterol as above   - Ensure follow-up with Dr. Arlyn Aguiar at [facility]. Last seen ~3 months ago per patient.       Patient was discharged to: home    New medications: prednisone; albuterol inhaler  Changes to old medications: n/a  Medications that were stopped: n/a    Items to follow up as outpatient: f/u PCP, f/u pulmonologist     Physical exam at the time of discharge:       LABS & STUDIES:  SARS-CoV-2: NotDetec (31 Dec 2023 09:23)   #Discharge: do not delete    MARIANA WILLETT is a 73y Male with a PMHx of AF and BESSIE (s/p tx 1.5y ago) presented for cough, SOB and fever at home, admitted for CAP treatment, course c/b AHRF necessitating supplemental O2.    Problem List/Main Diagnoses (system-based):     #Acute hypoxic respiratory failure.   Presented with shortness of breath, cough and fever x~ 3days. Recently exposed to covid 1 week ago but RVP and COVID negative.   Pt was hypoxic to 90% on RA and put on 3L NC. Empirically started on CAP treatment of IV CTX 1g QD and azithromycin 500mg PO QD. CXR showed bilateral infiltrates without lobar consolidation. WBC wnl. Currently does not meet SIRS criteria and Curb 65 score of 1. Antibiotics discontinued after 3 days.   ABG WNL. Urine strep/legionella negative. Blood cultures 12/31 with NGTD. Sputum culture growing normal respiratory akhil. Weaned off NC with SpO2 95% on RA at time of discharge. Improved physical exam with significantly reduced wheezing on auscultation.   Plan:   -Continue prednisone 40mg for a total 5-day course from 01/02-01/06  -Start albuterol inhaler PRN   -Monitor off antibiotics     #Hyponatremia.   Patient with Na of 130 on admission. Likely i/s/o poor PO intake. Improved to Na 137 at time of discharge.   -Resolved     #Chronic atrial fibrillation.   Patient has history of Afib. On home med: Eliquis 5 BID  -C/w home meds .    #H/O Mycobacterium avium complex infection.   Patient with h/o MAC, was diagnosed ~ 5 years and treated with Rifampin, Ethambutol  and Azithromycin for ~1.5 yrs ago. Likely residual scarring present from h/o infection. Treated with prednisone and DuoNebs as needed inpatient with clinical improvement and marked improvement in diffuse wheezing on ausculation.   - Continue with prednisone and albuterol as above   - Ensure follow-up with Dr. Arlyn Aguiar at [facility]. Last seen ~3 months ago per patient.       Patient was discharged to: home    New medications: prednisone; albuterol inhaler  Changes to old medications: n/a  Medications that were stopped: n/a    Items to follow up as outpatient: f/u PCP, f/u pulmonologist     Physical exam at the time of discharge:       LABS & STUDIES:  SARS-CoV-2: NotDetec (31 Dec 2023 09:23)

## 2024-01-03 NOTE — PROGRESS NOTE ADULT - SUBJECTIVE AND OBJECTIVE BOX
Physical Medicine and Rehabilitation Progress Note :       Patient is a 73y old  Male who presents with a chief complaint of Dyspnea, cough (03 Jan 2024 12:10)      HPI:  Patient is a 73y Male with PMH of Afib (on eliquis) and MAC diagnosed ~5 years and was on chronic abx until around 1.5 yrs ago) presenting to the ED with complaints of sob, cough and fever. Patient states his symptoms started 3 days when he had a temp of 102.4 and was coughing a lot. especially at night.  Pt is currently still feeling short of breathe and feelings of subjective fever. Reports he had dinner with a friend 1 week ago and the friend tested positive for Covid the next day, although he tested negative himself. Pt was also hypoxic to 90% on RA upon arrival and put on 3L NC sating 97%.  Denies runny nba, nasal congestion, abd pain, chest pain, palpitations, n/v, unexplained weight loss.     In the ED:  Initial vital signs: 97.4 T:  F, HR: 91, BP: 117/71, R: 20, SpO2: 97% on 3L NC  ED course:   Labs: significant for wbc wnl, Na 130   Imaging:  CXR: Bilateral infiltrates  EKG: Afib, vent rate 84  Medications: Duonebx x1, NS 1L bolus, CTX x1, Azithromycin 500 mg x1       (31 Dec 2023 11:48)                            13.2   10.99 )-----------( 452      ( 03 Jan 2024 05:30 )             39.6       01-03    137  |  98  |  14  ----------------------------<  119<H>  4.1   |  30  |  0.75    Ca    8.9      03 Jan 2024 05:30  Phos  3.3     01-03  Mg     2.2     01-03    TPro  6.2  /  Alb  2.0<L>  /  TBili  0.3  /  DBili  x   /  AST  21  /  ALT  21  /  AlkPhos  80  01-02    Vital Signs Last 24 Hrs  T(C): 36.7 (03 Jan 2024 05:00), Max: 36.8 (02 Jan 2024 20:30)  T(F): 98 (03 Jan 2024 05:00), Max: 98.3 (02 Jan 2024 20:30)  HR: 66 (03 Jan 2024 05:00) (66 - 84)  BP: 130/70 (03 Jan 2024 05:00) (112/64 - 130/70)  BP(mean): --  RR: 20 (03 Jan 2024 05:00) (20 - 20)  SpO2: 95% (03 Jan 2024 05:00) (90% - 95%)    Parameters below as of 03 Jan 2024 05:00  Patient On (Oxygen Delivery Method): room air        MEDICATIONS  (STANDING):  apixaban 5 milliGRAM(s) Oral every 12 hours  guaiFENesin Oral Liquid (Sugar-Free) 100 milliGRAM(s) Oral every 6 hours  predniSONE   Tablet 40 milliGRAM(s) Oral every 24 hours    MEDICATIONS  (PRN):  acetaminophen     Tablet .. 650 milliGRAM(s) Oral every 6 hours PRN Temp greater or equal to 38C (100.4F), Mild Pain (1 - 3)  albuterol/ipratropium for Nebulization 3 milliLiter(s) Nebulizer every 6 hours PRN Shortness of Breath and/or Wheezing  benzonatate 100 milliGRAM(s) Oral every 8 hours PRN Cough  hydrocodone/homatropine Syrup 5 milliLiter(s) Oral every 6 hours PRN Cough  melatonin 3 milliGRAM(s) Oral at bedtime PRN Insomnia  ondansetron Injectable 4 milliGRAM(s) IV Push every 8 hours PRN Nausea and/or Vomiting          Initial Functional Status Assessment :       Previous Level of Function:     · Ambulation Skills	independent  · Transfer Skills	independent  · ADL Skills	independent  · Additional Comments	Active community ambulator (daily runner) who lives with his wife in elevator access apartment, no SUNIL. Independent with all ADLs prior and denies use of AD when ambulating.    Cognitive Status Examination:   · Orientation	oriented to person, place, time and situation  · Level of Consciousness	alert  · Follows Commands and Answers Questions	100% of the time  · Personal Safety and Judgment	intact  · Short Term Memory	intact    Range of Motion Exam:   · Active Range of Motion Examination	bilateral upper extremity Active ROM was WFL (within functional limits); bilateral  lower extremity Active ROM was WFL (within functional limits)    Manual Muscle Testing:   · Manual Muscle Testing Results	BUE and BLE >3/5 grossly assessed via functional mobility    Bed Mobility: Rolling/Turning:     · Level of [place]	independent  · Physical Assist/Nonphysical Assist	verbal cues    Bed Mobility: Scooting/Bridging:     · Level of [place]	independent  · Physical Assist/Nonphysical Assist	verbal cues    Bed Mobility: Sit to Supine:     · Level of [place]	independent  · Physical Assist/Nonphysical Assist	verbal cues    Bed Mobility: Supine to Sit:     · Level of [place]	independent  · Physical Assist/Nonphysical Assist	verbal cues    Bed Mobility Analysis:     · Bed Mobility Limitations	Demos good strength throughout bed mobility    Transfer: Sit to Stand:     · Level of [place]	independent  · Physical Assist/Nonphysical Assist	verbal cues  · Assistive Device	no device    Transfer: Stand to Sit:     · Level of [place]	independent  · Physical Assist/Nonphysical Assist	verbal cues  · Assistive Device	no device    Sit/Stand Transfer Safety Analysis:     · Transfer Safety Concerns Noted	Demo good eccentric control during descend    Gait Skills:     · Level of [place]	independent  · Physical Assist/Nonphysical Assist	verbal cues  · Assistive Device	no device  · Gait Distance	75 feet    Gait Analysis:     · Gait Deviations Noted	decreased valeriano; decreased velocity of limb motion; decreased step length; decreased stride length; Demo fairly steady gait, no LOB observed, good navigation of hallway obstacles  · Impairments Contributing to Gait Deviations	impaired endurance    Balance Skills Assessment:     · Sitting Balance: Static	good balance  · Sitting Balance: Dynamic	good balance  · Sit-to-Stand Balance	good balance  · Standing Balance: Static	good balance  · Standing Balance: Dynamic	good balance    Previous Level of Function:     · Bed Mobility/Transfers	independent  · Bathing	independent  · Upper Body Dressing	independent  · Lower Body Dressing	independent  · Grooming	independent  · Toileting	independent  · Eating	independent  · Home Management Skills	independent  · Additional Comments	Pt lives with his wife in an elevator access apartment. Pt reports that prior to admission, pt was independent in all ADLs and IADLs, and ambulates with no device. 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      Cognitive Status Examination:   · Level of Consciousness	alert  · Orientation	oriented to person, place, time and situation  · Follow Commands/Answers Questions	100% of the time; able to follow single-step instructions  · Personal Safety and Judgment	intact  · Short Term Memory	intact  · Long Term Memory	intact    Range of Motion Exam:   · Range of Motion Examination, Upper Extremity	bilateral UE Active ROM was WNL (within normal limits); bilateral UE Passive ROM was WNL (within normal limits)  · Range of Motion Examination, Lower Extremity	bilateral LE Active ROM was WNL (within normal limits); bilateral LE Passive ROM was WNL (within normal limits)    Manual Muscle Testing:   · Manual Muscle Testing Results	BUE/BLE strength grossly greater than or equal to 3+/5 based on functional assessment against gravity.    Bed Mobility: Rolling/Turning:     · Level of [place]	independent    Bed Mobility: Scooting/Bridging:     · Level of [place]	independent    Bed Mobility: Sit to Supine:     · Level of [place]	independent    Bed Mobility: Supine to Sit:     · Level of [place]	independent    Transfer: Sit to Stand:     · Level of [place]	independent    Transfer: Stand to Sit:     · Level of [place]	independent    Balance Skills Assessment:     · Sitting Balance: Static	normal balance  · Sitting Balance: Dynamic	normal balance  · Sit-to-Stand Balance	normal balance  · Standing Balance: Static	good balance  · Standing Balance: Dynamic	good balance    Sensory Examination:     Grossly Intact:   · Gross Sensory Examination	Grossly Intact; Left UE; Right UE; Left LE; Right LE    · Balance Skills	Pt performed functional mobility in hallway with no device and independence.      Fine Motor Coordination:     Grossly Intact:   · Grossly Intact	Left UE; Right UE      Upper Body Dressing Training:     · Level of [place]	independent    Lower Body Dressing Training:     · Level of [place]	independent        PM&R Impression : as above    Current disposition plan recommendation :      d/c home with no PT/OT needs          Physical Medicine and Rehabilitation Progress Note :       Patient is a 73y old  Male who presents with a chief complaint of Dyspnea, cough (03 Jan 2024 12:10)      HPI:  Patient is a 73y Male with PMH of Afib (on eliquis) and MAC diagnosed ~5 years and was on chronic abx until around 1.5 yrs ago) presenting to the ED with complaints of sob, cough and fever. Patient states his symptoms started 3 days when he had a temp of 102.4 and was coughing a lot. especially at night.  Pt is currently still feeling short of breathe and feelings of subjective fever. Reports he had dinner with a friend 1 week ago and the friend tested positive for Covid the next day, although he tested negative himself. Pt was also hypoxic to 90% on RA upon arrival and put on 3L NC sating 97%.  Denies runny nba, nasal congestion, abd pain, chest pain, palpitations, n/v, unexplained weight loss.     In the ED:  Initial vital signs: 97.4 T:  F, HR: 91, BP: 117/71, R: 20, SpO2: 97% on 3L NC  ED course:   Labs: significant for wbc wnl, Na 130   Imaging:  CXR: Bilateral infiltrates  EKG: Afib, vent rate 84  Medications: Duonebx x1, NS 1L bolus, CTX x1, Azithromycin 500 mg x1       (31 Dec 2023 11:48)                            13.2   10.99 )-----------( 452      ( 03 Jan 2024 05:30 )             39.6       01-03    137  |  98  |  14  ----------------------------<  119<H>  4.1   |  30  |  0.75    Ca    8.9      03 Jan 2024 05:30  Phos  3.3     01-03  Mg     2.2     01-03    TPro  6.2  /  Alb  2.0<L>  /  TBili  0.3  /  DBili  x   /  AST  21  /  ALT  21  /  AlkPhos  80  01-02    Vital Signs Last 24 Hrs  T(C): 36.7 (03 Jan 2024 05:00), Max: 36.8 (02 Jan 2024 20:30)  T(F): 98 (03 Jan 2024 05:00), Max: 98.3 (02 Jan 2024 20:30)  HR: 66 (03 Jan 2024 05:00) (66 - 84)  BP: 130/70 (03 Jan 2024 05:00) (112/64 - 130/70)  BP(mean): --  RR: 20 (03 Jan 2024 05:00) (20 - 20)  SpO2: 95% (03 Jan 2024 05:00) (90% - 95%)    Parameters below as of 03 Jan 2024 05:00  Patient On (Oxygen Delivery Method): room air        MEDICATIONS  (STANDING):  apixaban 5 milliGRAM(s) Oral every 12 hours  guaiFENesin Oral Liquid (Sugar-Free) 100 milliGRAM(s) Oral every 6 hours  predniSONE   Tablet 40 milliGRAM(s) Oral every 24 hours    MEDICATIONS  (PRN):  acetaminophen     Tablet .. 650 milliGRAM(s) Oral every 6 hours PRN Temp greater or equal to 38C (100.4F), Mild Pain (1 - 3)  albuterol/ipratropium for Nebulization 3 milliLiter(s) Nebulizer every 6 hours PRN Shortness of Breath and/or Wheezing  benzonatate 100 milliGRAM(s) Oral every 8 hours PRN Cough  hydrocodone/homatropine Syrup 5 milliLiter(s) Oral every 6 hours PRN Cough  melatonin 3 milliGRAM(s) Oral at bedtime PRN Insomnia  ondansetron Injectable 4 milliGRAM(s) IV Push every 8 hours PRN Nausea and/or Vomiting          Initial Functional Status Assessment :       Previous Level of Function:     · Ambulation Skills	independent  · Transfer Skills	independent  · ADL Skills	independent  · Additional Comments	Active community ambulator (daily runner) who lives with his wife in elevator access apartment, no SUNIL. Independent with all ADLs prior and denies use of AD when ambulating.    Cognitive Status Examination:   · Orientation	oriented to person, place, time and situation  · Level of Consciousness	alert  · Follows Commands and Answers Questions	100% of the time  · Personal Safety and Judgment	intact  · Short Term Memory	intact    Range of Motion Exam:   · Active Range of Motion Examination	bilateral upper extremity Active ROM was WFL (within functional limits); bilateral  lower extremity Active ROM was WFL (within functional limits)    Manual Muscle Testing:   · Manual Muscle Testing Results	BUE and BLE >3/5 grossly assessed via functional mobility    Bed Mobility: Rolling/Turning:     · Level of [place]	independent  · Physical Assist/Nonphysical Assist	verbal cues    Bed Mobility: Scooting/Bridging:     · Level of [place]	independent  · Physical Assist/Nonphysical Assist	verbal cues    Bed Mobility: Sit to Supine:     · Level of [place]	independent  · Physical Assist/Nonphysical Assist	verbal cues    Bed Mobility: Supine to Sit:     · Level of [place]	independent  · Physical Assist/Nonphysical Assist	verbal cues    Bed Mobility Analysis:     · Bed Mobility Limitations	Demos good strength throughout bed mobility    Transfer: Sit to Stand:     · Level of [place]	independent  · Physical Assist/Nonphysical Assist	verbal cues  · Assistive Device	no device    Transfer: Stand to Sit:     · Level of [place]	independent  · Physical Assist/Nonphysical Assist	verbal cues  · Assistive Device	no device    Sit/Stand Transfer Safety Analysis:     · Transfer Safety Concerns Noted	Demo good eccentric control during descend    Gait Skills:     · Level of [place]	independent  · Physical Assist/Nonphysical Assist	verbal cues  · Assistive Device	no device  · Gait Distance	75 feet    Gait Analysis:     · Gait Deviations Noted	decreased valeriano; decreased velocity of limb motion; decreased step length; decreased stride length; Demo fairly steady gait, no LOB observed, good navigation of hallway obstacles  · Impairments Contributing to Gait Deviations	impaired endurance    Balance Skills Assessment:     · Sitting Balance: Static	good balance  · Sitting Balance: Dynamic	good balance  · Sit-to-Stand Balance	good balance  · Standing Balance: Static	good balance  · Standing Balance: Dynamic	good balance    Previous Level of Function:     · Bed Mobility/Transfers	independent  · Bathing	independent  · Upper Body Dressing	independent  · Lower Body Dressing	independent  · Grooming	independent  · Toileting	independent  · Eating	independent  · Home Management Skills	independent  · Additional Comments	Pt lives with his wife in an elevator access apartment. Pt reports that prior to admission, pt was independent in all ADLs and IADLs, and ambulates with no device. 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      Cognitive Status Examination:   · Level of Consciousness	alert  · Orientation	oriented to person, place, time and situation  · Follow Commands/Answers Questions	100% of the time; able to follow single-step instructions  · Personal Safety and Judgment	intact  · Short Term Memory	intact  · Long Term Memory	intact    Range of Motion Exam:   · Range of Motion Examination, Upper Extremity	bilateral UE Active ROM was WNL (within normal limits); bilateral UE Passive ROM was WNL (within normal limits)  · Range of Motion Examination, Lower Extremity	bilateral LE Active ROM was WNL (within normal limits); bilateral LE Passive ROM was WNL (within normal limits)    Manual Muscle Testing:   · Manual Muscle Testing Results	BUE/BLE strength grossly greater than or equal to 3+/5 based on functional assessment against gravity.    Bed Mobility: Rolling/Turning:     · Level of [place]	independent    Bed Mobility: Scooting/Bridging:     · Level of [place]	independent    Bed Mobility: Sit to Supine:     · Level of [place]	independent    Bed Mobility: Supine to Sit:     · Level of [place]	independent    Transfer: Sit to Stand:     · Level of [place]	independent    Transfer: Stand to Sit:     · Level of [place]	independent    Balance Skills Assessment:     · Sitting Balance: Static	normal balance  · Sitting Balance: Dynamic	normal balance  · Sit-to-Stand Balance	normal balance  · Standing Balance: Static	good balance  · Standing Balance: Dynamic	good balance    Sensory Examination:     Grossly Intact:   · Gross Sensory Examination	Grossly Intact; Left UE; Right UE; Left LE; Right LE    · Balance Skills	Pt performed functional mobility in hallway with no device and independence.      Fine Motor Coordination:     Grossly Intact:   · Grossly Intact	Left UE; Right UE      Upper Body Dressing Training:     · Level of [place]	independent    Lower Body Dressing Training:     · Level of [place]	independent        PM&R Impression : as above    Current disposition plan recommendation :      d/c home with no PT/OT needs

## 2024-01-03 NOTE — PROGRESS NOTE ADULT - ASSESSMENT
I M     73 y o Male with PMH of Afib (on eliquis) and MAC (disgnosed ~5 years and was on chronic abx until around 1.5 yrs ago) presenting to the ED with complaints of sob, cough and fever, found to be hypoxic on RA with bilateral infiltrates on CXR admitted for further medical management and treatment of CAP.     Problem/Plan - 1:  ·  Problem: Acute hypoxic respiratory failure.   ·  Plan: Pt p/w sob, cough and fever x~ 3days. Recently exposed to covid 1 week ago.   Pt was hypoxic to 90% on RA and put on 3L NC, satting 93% currently.  CXR showed bilateral infiltrates. WBC wnl. Currently does not meet SIRS criteria and Curb 65 score of 1  ABG WNL. RVP WNL. Urine strep/legionella negative. Blood cultures 12/31 with NGTD. Sputum culture growing normal respiratory akhil.   Plan:   -C/w CTX and Azithromycin; would discharge on levaquin given bronchiectasis and c/f pseudomonal coverage  -Consider CT chest if not improving clinically   -Start Robitussin 100mg q6h PRN  -C/w Duonebs prn and Tessalon perle   -Wean O2 as tolerated.    Problem/Plan - 2:  ·  Problem: Pneumonia.   ·  Plan: -Plan as above.    Problem/Plan - 3:  ·  Problem: Hyponatremia.   ·  Plan: RESOLVED  Patient with Na of 130 on admission. Likely i/s/o poor PO intake. Now improved to 135.    Problem/Plan - 4:  ·  Problem: Chronic atrial fibrillation.   ·  Plan: Patient has history of Afib  Home med: Eliquis 5 BID    Plan:  - C/w eliquis 5mg BID.    Problem/Plan - 5:  ·  Problem: H/O Mycobacterium avium complex infection.   ·  Plan: Patient with h/o MAC, was diagnosed ~ 5 years and treated with Rifampin, Ethambutol  and Azithromycin for ~1.5 yrs ago.   Patient follow with Dr. Arlyn Aguiar at [facility]. Last seen ~3 months ago per patient.   -Follow up with outpatient.    Problem/Plan - 6:  ·  Problem: Prophylactic measure.   ·  Plan: F: S/P 1L bolus   E: Replete as needed  N: Regular   D: Eliquis  GI: NI  Code: FULL CODE.    Attestation Statements:   Attestation Statements:  I have personally seen and examined the patient.  I fully participated in the care of this patient.  I have made amendments to the documentation where necessary, and agree with the history, physical exam, and plan as documented by the Resident.     Pt is 72 yo man with history of bronchiectasis, pulmonary MAC with therapy finishing a year ago, admitted with complains of cough and SOB. Pt was found to have diffuse wheezing on exam. Respiratory culture grew oral akhil. No fever or leukocytosis seen on BW. CXR did not show infiltrates.   ---will treat pt for obstructive lung disease with steroids and bronchodilators, likely reactive airway disease. Pt admits to feeling wheezy with physical exertion.   ---if pt feels better can be discharged home tomorrow .     Risk Statement (NON-critical care).     On this date of service, level of risk to patient is considered: Moderate.     Due to: Reactive airway disease.         I M     73 y o Male with PMH of Afib (on eliquis) and MAC (disgnosed ~5 years and was on chronic abx until around 1.5 yrs ago) presenting to the ED with complaints of sob, cough and fever, found to be hypoxic on RA with bilateral infiltrates on CXR admitted for further medical management and treatment of CAP.     Problem/Plan - 1:  ·  Problem: Acute hypoxic respiratory failure.   ·  Plan: Pt p/w sob, cough and fever x~ 3days. Recently exposed to covid 1 week ago.   Pt was hypoxic to 90% on RA and put on 3L NC, satting 93% currently.  CXR showed bilateral infiltrates. WBC wnl. Currently does not meet SIRS criteria and Curb 65 score of 1  ABG WNL. RVP WNL. Urine strep/legionella negative. Blood cultures 12/31 with NGTD. Sputum culture growing normal respiratory akhil.   Plan:   -C/w CTX and Azithromycin; would discharge on levaquin given bronchiectasis and c/f pseudomonal coverage  -Consider CT chest if not improving clinically   -Start Robitussin 100mg q6h PRN  -C/w Duonebs prn and Tessalon perle   -Wean O2 as tolerated.    Problem/Plan - 2:  ·  Problem: Pneumonia.   ·  Plan: -Plan as above.    Problem/Plan - 3:  ·  Problem: Hyponatremia.   ·  Plan: RESOLVED  Patient with Na of 130 on admission. Likely i/s/o poor PO intake. Now improved to 135.    Problem/Plan - 4:  ·  Problem: Chronic atrial fibrillation.   ·  Plan: Patient has history of Afib  Home med: Eliquis 5 BID    Plan:  - C/w eliquis 5mg BID.    Problem/Plan - 5:  ·  Problem: H/O Mycobacterium avium complex infection.   ·  Plan: Patient with h/o MAC, was diagnosed ~ 5 years and treated with Rifampin, Ethambutol  and Azithromycin for ~1.5 yrs ago.   Patient follow with Dr. Arlyn Aguiar at [facility]. Last seen ~3 months ago per patient.   -Follow up with outpatient.    Problem/Plan - 6:  ·  Problem: Prophylactic measure.   ·  Plan: F: S/P 1L bolus   E: Replete as needed  N: Regular   D: Eliquis  GI: NI  Code: FULL CODE.    Attestation Statements:   Attestation Statements:  I have personally seen and examined the patient.  I fully participated in the care of this patient.  I have made amendments to the documentation where necessary, and agree with the history, physical exam, and plan as documented by the Resident.     Pt is 74 yo man with history of bronchiectasis, pulmonary MAC with therapy finishing a year ago, admitted with complains of cough and SOB. Pt was found to have diffuse wheezing on exam. Respiratory culture grew oral akhil. No fever or leukocytosis seen on BW. CXR did not show infiltrates.   ---will treat pt for obstructive lung disease with steroids and bronchodilators, likely reactive airway disease. Pt admits to feeling wheezy with physical exertion.   ---if pt feels better can be discharged home tomorrow .     Risk Statement (NON-critical care).     On this date of service, level of risk to patient is considered: Moderate.     Due to: Reactive airway disease.

## 2024-01-03 NOTE — DISCHARGE NOTE NURSING/CASE MANAGEMENT/SOCIAL WORK - PATIENT PORTAL LINK FT
You can access the FollowMyHealth Patient Portal offered by [facility] by registering at the following website: [email or web address]. By joining [employer]’s FollowMyHealth portal, you will also be able to view your health information using other applications (apps) compatible with our system. You can access the FollowMyHealth Patient Portal offered by [facility] by registering at the following website: [email or web address]. By joining [employer]’s FollowMyHealth portal, you will also be able to view your health information using other applications (apps) compatible with our system.

## 2024-01-03 NOTE — DISCHARGE NOTE PROVIDER - NSDCCPTREATMENT_GEN_ALL_CORE_FT
PRINCIPAL PROCEDURE  Procedure: XR chest AP  Findings and Treatment:   < end of copied text >  INTERPRETATION:  Clinical history/reason for exam: Hypoxia.  Frontal chest.  COMPARISON: December 31, 2023.  Findings/  impression: Hyperinflation. Right apical opacity. Chest lordotic is   recommended to exclude a parenchymal nodule. Bilateral opacities,   unchanged.. Heart and mediastinum are unremarkable. Stable bony   structures.< from: Xray Chest 1 View- PORTABLE-Routine (Xray Chest 1 View- PORTABLE-Routine in AM.) (01.02.24 @ 06:08) >        SECONDARY PROCEDURE  Procedure: XR chest AP  Findings and Treatment:   < end of copied text >  INTERPRETATION:  Clinical History: Hypoxia  Frontal and lateral examination of the chest demonstrates the heart to be   withinnormal limits in transverse diameter. Bilateral infiltrates. Mild   dextro scoliosis thoracic spine with degenerative changes.  IMPRESSION: Bilateral infiltrates< from: Xray Chest 1 View- PORTABLE-Urgent (Xray Chest 1 View- PORTABLE-Urgent .) (12.31.23 @ 09:55) >

## 2024-01-03 NOTE — DISCHARGE NOTE PROVIDER - NSDCMRMEDTOKEN_GEN_ALL_CORE_FT
Albuterol (Eqv-ProAir HFA) 90 mcg/inh inhalation aerosol: 2 puff(s) inhaled 4 times a day as needed for  shortness of breath and/or wheezing  ELIQUIS  5 MG TABS: 1 tab(s) orally every 12 hours  predniSONE 20 mg oral tablet: 2 tab(s) orally every 24 hours

## 2024-01-03 NOTE — DISCHARGE NOTE PROVIDER - CARE PROVIDER_API CALL
Hebert Peter  Internal Medicine  [address] [place] [address] [address]  [place], [place] [address]  Phone: [phone]  Fax: ()-  Established Patient  Follow Up Time: 2 weeks   Hebert Peter  Internal Medicine  [address] [place] [address] [address]  [place], [place] [address]  Phone: [phone]  Fax: ()-  Established Patient  Follow Up Time: 2 weeks

## 2024-01-03 NOTE — DISCHARGE NOTE NURSING/CASE MANAGEMENT/SOCIAL WORK - NSDCPEFALRISK_GEN_ALL_CORE
For information on Fall & Injury Prevention, visit: [email or web address] OR  [email or web address] OR  https://www.cdc.gov/steadi/patient.html For information on Fall & Injury Prevention, visit: [email or web address] OR  [email or web address]/[email or web address]-[email or web address]-injury OR  https://www.cdc.gov/steadi/patient.html

## 2024-01-03 NOTE — CHART NOTE - NSCHARTNOTEFT_GEN_A_CORE
Pt is assessed for dc readiness today. Noted to have only mild wheezing on expiration. Otherwise cough much decreased and pt does not require any oxygen supplementation. Pt will follow up with his Pulmonologist at [facility] at the end of the week. Pt is assessed for dc readiness today. Noted to have only mild wheezing on expiration. Otherwise cough much decreased and pt does not require any oxygen supplementation. Pt will follow up with his Pulmonologist at [facility] at the end of the week.

## 2024-01-03 NOTE — DISCHARGE NOTE PROVIDER - PROVIDER TOKENS
PROVIDER:[TOKEN:[[id number]:[facility]:[id number]],FOLLOWUP:[2 weeks],ESTABLISHEDPATIENT:[T]] PROVIDER:[TOKEN:[[id number]:[facility]:[id number]],FOLLOWUP:[2 weeks],ESTABLISHEDPATIENT:[T]]

## 2024-01-05 LAB
CULTURE RESULTS: SIGNIFICANT CHANGE UP
SPECIMEN SOURCE: SIGNIFICANT CHANGE UP

## 2024-01-10 DIAGNOSIS — J18.9 PNEUMONIA, UNSPECIFIED ORGANISM: ICD-10-CM

## 2024-01-10 DIAGNOSIS — J96.01 ACUTE RESPIRATORY FAILURE WITH HYPOXIA: ICD-10-CM

## 2024-01-10 DIAGNOSIS — J45.909 UNSPECIFIED ASTHMA, UNCOMPLICATED: ICD-10-CM

## 2024-01-10 DIAGNOSIS — Z20.822 CONTACT WITH AND (SUSPECTED) EXPOSURE TO COVID-19: ICD-10-CM

## 2024-01-10 DIAGNOSIS — Z79.01 LONG TERM (CURRENT) USE OF ANTICOAGULANTS: ICD-10-CM

## 2024-01-10 DIAGNOSIS — R06.02 SHORTNESS OF BREATH: ICD-10-CM

## 2024-01-10 DIAGNOSIS — E87.1 HYPO-OSMOLALITY AND HYPONATREMIA: ICD-10-CM

## 2024-01-10 DIAGNOSIS — J47.9 BRONCHIECTASIS, UNCOMPLICATED: ICD-10-CM

## 2024-01-10 DIAGNOSIS — Z87.891 PERSONAL HISTORY OF NICOTINE DEPENDENCE: ICD-10-CM

## 2024-01-10 DIAGNOSIS — Z86.19 PERSONAL HISTORY OF OTHER INFECTIOUS AND PARASITIC DISEASES: ICD-10-CM

## 2024-01-10 DIAGNOSIS — I48.20 CHRONIC ATRIAL FIBRILLATION, UNSPECIFIED: ICD-10-CM
